# Patient Record
Sex: MALE | Race: WHITE | NOT HISPANIC OR LATINO | Employment: FULL TIME | URBAN - METROPOLITAN AREA
[De-identification: names, ages, dates, MRNs, and addresses within clinical notes are randomized per-mention and may not be internally consistent; named-entity substitution may affect disease eponyms.]

---

## 2018-06-01 ENCOUNTER — TRANSCRIBE ORDERS (OUTPATIENT)
Dept: LAB | Facility: HOSPITAL | Age: 43
End: 2018-06-01

## 2018-06-01 ENCOUNTER — LAB (OUTPATIENT)
Dept: LAB | Facility: HOSPITAL | Age: 43
End: 2018-06-01

## 2018-06-01 DIAGNOSIS — R19.7 DIARRHEA OF PRESUMED INFECTIOUS ORIGIN: Primary | ICD-10-CM

## 2018-06-01 DIAGNOSIS — R19.7 DIARRHEA OF PRESUMED INFECTIOUS ORIGIN: ICD-10-CM

## 2018-06-01 LAB — HEMOCCULT STL QL: NEGATIVE

## 2018-06-01 PROCEDURE — 82270 OCCULT BLOOD FECES: CPT

## 2018-06-01 PROCEDURE — 87177 OVA AND PARASITES SMEARS: CPT

## 2018-06-01 PROCEDURE — 87209 SMEAR COMPLEX STAIN: CPT

## 2018-06-04 LAB
O+P SPEC MICRO: NORMAL
O+P STL TRI STN: NORMAL

## 2019-04-05 PROBLEM — R33.9 URINARY RETENTION: Status: ACTIVE | Noted: 2019-04-05

## 2019-04-05 PROBLEM — R10.30 LOWER ABDOMINAL PAIN: Status: ACTIVE | Noted: 2019-04-05

## 2019-04-05 PROBLEM — E78.1 HYPERTRIGLYCERIDEMIA: Status: ACTIVE | Noted: 2019-04-05

## 2019-04-05 PROBLEM — I10 ESSENTIAL HYPERTENSION: Status: ACTIVE | Noted: 2019-04-05

## 2019-05-02 PROBLEM — J30.9 ALLERGIC RHINITIS: Status: ACTIVE | Noted: 2019-05-02

## 2019-05-02 PROBLEM — M54.50 PAIN IN LOWER BACK: Status: ACTIVE | Noted: 2019-05-02

## 2019-05-02 PROBLEM — R10.84 ABDOMINAL PAIN, GENERALIZED: Status: ACTIVE | Noted: 2019-05-02

## 2019-05-21 ENCOUNTER — LAB (OUTPATIENT)
Dept: LAB | Facility: HOSPITAL | Age: 44
End: 2019-05-21

## 2019-05-21 ENCOUNTER — OFFICE VISIT (OUTPATIENT)
Dept: INTERNAL MEDICINE | Facility: CLINIC | Age: 44
End: 2019-05-21

## 2019-05-21 VITALS
HEIGHT: 72 IN | BODY MASS INDEX: 25.87 KG/M2 | TEMPERATURE: 97.1 F | WEIGHT: 191 LBS | SYSTOLIC BLOOD PRESSURE: 118 MMHG | HEART RATE: 68 BPM | DIASTOLIC BLOOD PRESSURE: 90 MMHG

## 2019-05-21 DIAGNOSIS — I10 ESSENTIAL HYPERTENSION: ICD-10-CM

## 2019-05-21 DIAGNOSIS — Z00.00 PREVENTATIVE HEALTH CARE: Primary | ICD-10-CM

## 2019-05-21 DIAGNOSIS — Z12.5 SCREENING PSA (PROSTATE SPECIFIC ANTIGEN): ICD-10-CM

## 2019-05-21 DIAGNOSIS — Z13.29 SCREENING FOR ENDOCRINE DISORDER: ICD-10-CM

## 2019-05-21 DIAGNOSIS — E78.1 HYPERTRIGLYCERIDEMIA: ICD-10-CM

## 2019-05-21 LAB
ALBUMIN SERPL-MCNC: 4.8 G/DL (ref 3.5–5.2)
ALBUMIN UR-MCNC: 1.3 MG/L
ALBUMIN/GLOB SERPL: 2.7 G/DL
ALP SERPL-CCNC: 59 U/L (ref 39–117)
ALT SERPL W P-5'-P-CCNC: 16 U/L (ref 1–41)
ANION GAP SERPL CALCULATED.3IONS-SCNC: 11.9 MMOL/L
AST SERPL-CCNC: 19 U/L (ref 1–40)
BASOPHILS # BLD AUTO: 0.05 10*3/MM3 (ref 0–0.2)
BASOPHILS NFR BLD AUTO: 0.7 % (ref 0–1.5)
BILIRUB SERPL-MCNC: 0.8 MG/DL (ref 0.2–1.2)
BUN BLD-MCNC: 8 MG/DL (ref 6–20)
BUN/CREAT SERPL: 9.8 (ref 7–25)
CALCIUM SPEC-SCNC: 9.3 MG/DL (ref 8.6–10.5)
CHLORIDE SERPL-SCNC: 103 MMOL/L (ref 98–107)
CHOLEST SERPL-MCNC: 152 MG/DL (ref 0–200)
CO2 SERPL-SCNC: 27.1 MMOL/L (ref 22–29)
CREAT BLD-MCNC: 0.82 MG/DL (ref 0.76–1.27)
CREAT UR-MCNC: 108.1 MG/DL
DEPRECATED RDW RBC AUTO: 39.4 FL (ref 37–54)
EOSINOPHIL # BLD AUTO: 0 10*3/MM3 (ref 0–0.4)
EOSINOPHIL NFR BLD AUTO: 0 % (ref 0.3–6.2)
ERYTHROCYTE [DISTWIDTH] IN BLOOD BY AUTOMATED COUNT: 11.6 % (ref 12.3–15.4)
GFR SERPL CREATININE-BSD FRML MDRD: 103 ML/MIN/1.73
GLOBULIN UR ELPH-MCNC: 1.8 GM/DL
GLUCOSE BLD-MCNC: 96 MG/DL (ref 65–99)
HCT VFR BLD AUTO: 48.9 % (ref 37.5–51)
HDLC SERPL-MCNC: 48 MG/DL (ref 40–60)
HGB BLD-MCNC: 16.3 G/DL (ref 13–17.7)
IMM GRANULOCYTES # BLD AUTO: 0.02 10*3/MM3 (ref 0–0.05)
IMM GRANULOCYTES NFR BLD AUTO: 0.3 % (ref 0–0.5)
LDLC SERPL CALC-MCNC: 67 MG/DL (ref 0–100)
LDLC/HDLC SERPL: 1.39 {RATIO}
LYMPHOCYTES # BLD AUTO: 1.72 10*3/MM3 (ref 0.7–3.1)
LYMPHOCYTES NFR BLD AUTO: 24.4 % (ref 19.6–45.3)
MCH RBC QN AUTO: 31 PG (ref 26.6–33)
MCHC RBC AUTO-ENTMCNC: 33.3 G/DL (ref 31.5–35.7)
MCV RBC AUTO: 93 FL (ref 79–97)
MICROALBUMIN/CREAT UR: 12 MG/G
MONOCYTES # BLD AUTO: 0.53 10*3/MM3 (ref 0.1–0.9)
MONOCYTES NFR BLD AUTO: 7.5 % (ref 5–12)
NEUTROPHILS # BLD AUTO: 4.74 10*3/MM3 (ref 1.7–7)
NEUTROPHILS NFR BLD AUTO: 67.1 % (ref 42.7–76)
NRBC BLD AUTO-RTO: 0 /100 WBC (ref 0–0.2)
PLATELET # BLD AUTO: 190 10*3/MM3 (ref 140–450)
PMV BLD AUTO: 10.8 FL (ref 6–12)
POTASSIUM BLD-SCNC: 4.7 MMOL/L (ref 3.5–5.2)
PROT SERPL-MCNC: 6.6 G/DL (ref 6–8.5)
PSA SERPL-MCNC: 0.7 NG/ML (ref 0–4)
RBC # BLD AUTO: 5.26 10*6/MM3 (ref 4.14–5.8)
SODIUM BLD-SCNC: 142 MMOL/L (ref 136–145)
TRIGL SERPL-MCNC: 187 MG/DL (ref 0–150)
TSH SERPL DL<=0.05 MIU/L-ACNC: 2.12 MIU/ML (ref 0.27–4.2)
VLDLC SERPL-MCNC: 37.4 MG/DL (ref 5–40)
WBC NRBC COR # BLD: 7.06 10*3/MM3 (ref 3.4–10.8)

## 2019-05-21 PROCEDURE — 36415 COLL VENOUS BLD VENIPUNCTURE: CPT

## 2019-05-21 PROCEDURE — 85025 COMPLETE CBC W/AUTO DIFF WBC: CPT

## 2019-05-21 PROCEDURE — 82043 UR ALBUMIN QUANTITATIVE: CPT

## 2019-05-21 PROCEDURE — 80053 COMPREHEN METABOLIC PANEL: CPT

## 2019-05-21 PROCEDURE — 82570 ASSAY OF URINE CREATININE: CPT

## 2019-05-21 PROCEDURE — 84443 ASSAY THYROID STIM HORMONE: CPT

## 2019-05-21 PROCEDURE — G0103 PSA SCREENING: HCPCS

## 2019-05-21 PROCEDURE — 99396 PREV VISIT EST AGE 40-64: CPT | Performed by: INTERNAL MEDICINE

## 2019-05-21 PROCEDURE — 93000 ELECTROCARDIOGRAM COMPLETE: CPT | Performed by: INTERNAL MEDICINE

## 2019-05-21 PROCEDURE — 80061 LIPID PANEL: CPT

## 2019-05-21 RX ORDER — LISINOPRIL 20 MG/1
20 TABLET ORAL DAILY
Qty: 60 TABLET | Refills: 5 | Status: SHIPPED | OUTPATIENT
Start: 2019-05-21 | End: 2020-06-15

## 2019-05-21 RX ORDER — LISINOPRIL 10 MG/1
1 TABLET ORAL DAILY
COMMUNITY
Start: 2018-11-01 | End: 2019-05-21 | Stop reason: DRUGHIGH

## 2019-05-21 NOTE — PROGRESS NOTES
"Chief Complaint   Patient presents with   • Annual Exam     fasting for labs       History of Present Illness  44 y.o. white male presents for wellness exam. He has been doing ok. He denies chest pain or shortness of breath.    Review of Systems   Constitutional: Negative for chills and fever.   HENT: Positive for postnasal drip.    Respiratory: Negative for cough and shortness of breath.    Cardiovascular: Negative for chest pain and palpitations.   Gastrointestinal: Negative for abdominal pain, nausea and vomiting.   Genitourinary: Negative for dysuria.   Musculoskeletal: Negative for back pain.   Skin: Negative for rash.   Allergic/Immunologic: Positive for environmental allergies.   Neurological: Negative for dizziness, light-headedness and headaches.   Hematological: Negative for adenopathy.   Psychiatric/Behavioral: Negative for dysphoric mood. The patient is not nervous/anxious.    All other systems reviewed and are negative.    All other ROS reviewed and negative.    PMSFH:  The following portions of the patient's history were reviewed and updated as appropriate: allergies, current medications, past family history, past medical history, past social history, past surgical history and problem list.      Current Outpatient Medications:   •  lisinopril (PRINIVIL,ZESTRIL) 20 MG tablet, Take 1 tablet by mouth Daily. Take an additional dose if systolic >150 or diastolic >95, Disp: 60 tablet, Rfl: 5    VITALS:  /90   Pulse 68   Temp 97.1 °F (36.2 °C)   Ht 182 cm (71.65\")   Wt 86.6 kg (191 lb)   BMI 26.16 kg/m²     Physical Exam   Constitutional: He is oriented to person, place, and time. He appears well-developed and well-nourished.   HENT:   Head: Normocephalic.   Right Ear: External ear normal.   Left Ear: External ear normal.   Mouth/Throat: Oropharynx is clear and moist.   Eyes: Conjunctivae and EOM are normal.   Neck: No JVD present.   Cardiovascular: Normal rate, regular rhythm and normal heart " sounds.   Pulmonary/Chest: Effort normal and breath sounds normal. No respiratory distress.   Abdominal: Soft. Bowel sounds are normal. There is no tenderness.   Genitourinary: Penis normal.   Musculoskeletal: Normal range of motion.   Lymphadenopathy:     He has no cervical adenopathy.   Neurological: He is alert and oriented to person, place, and time.   Skin: Skin is warm and dry.   Psychiatric: He has a normal mood and affect. His behavior is normal.   Nursing note and vitals reviewed.      LABS:  Results for orders placed or performed in visit on 06/01/18   Ova & Parasite Examination - Stool, Per Rectum   Result Value Ref Range    Ova + Parasite Exam No ova or parasites seen on concentrated smear No Ova or Parasites Seen    Trichrome Stain No ova or parasites seen on trichrome stain    Occult Blood X 1, Stool - Stool, Per Rectum   Result Value Ref Range    Fecal Occult Blood Negative Negative         ECG 12 Lead  Date/Time: 5/21/2019 2:50 PM  Performed by: Keenan Jose MD  Authorized by: Keenan Jose MD   Comparison: compared with previous ECG from 5/10/2018  Comparison to previous ECG: More prominent bradycardia but basically stable  Rhythm: sinus bradycardia  QRS axis: normal                   ASSESSMENT/PLAN:  Problem List Items Addressed This Visit        Cardiovascular and Mediastinum    Hypertriglyceridemia     .Discussed improving diet and exercise. Specifically, decrease saturated fats and trans fats and avoid bad carbohydrates (sweet, breads , potatoes, and high caloric drinks).  Also aim for 150 minutes aerobic exercise weekly and resistance exercises 2-3x/week.           Relevant Orders    CBC & Differential    Essential hypertension     Hypertension is improving with treatment.  Continue current treatment regimen.  Dietary sodium restriction.  Weight loss.  Regular aerobic exercise.  Medication changes per orders.  Blood pressure will be reassessed at the next regular appointmentIncrease  the lisinopril. .         Relevant Medications    lisinopril (PRINIVIL,ZESTRIL) 20 MG tablet    Other Relevant Orders    Lipid Panel    Comprehensive Metabolic Panel    Microalbumin / Creatinine Urine Ratio - Urine, Clean Catch       Other    Preventative health care - Primary     Check labs and he will schedule colonoscopy on his  own. Encouraged to get an eye exam. Age-appropriate Counseling:  Discussed preventative medicine issues with patient including regular exercise, healthy diet, stress reduction, adequate sleep and recommended age-appropriate screening studies.  Immunizations reviewed.   He will schedule his w own Dermatology follow up and opthamology appointment. He declined Hep A vaccine.            Relevant Medications    lisinopril (PRINIVIL,ZESTRIL) 20 MG tablet      Other Visit Diagnoses     Screening for endocrine disorder        Relevant Orders    TSH Rfx On Abnormal To Free T4    Screening PSA (prostate specific antigen)        Relevant Orders    PSA Screen          FOLLOW-UP:  No Follow-up on file.      Electronically signed by:    Keenan Jose MD

## 2019-05-21 NOTE — ASSESSMENT & PLAN NOTE
Hypertension is improving with treatment.  Continue current treatment regimen.  Dietary sodium restriction.  Weight loss.  Regular aerobic exercise.  Medication changes per orders.  Blood pressure will be reassessed at the next regular appointmentIncrease the lisinopril. .

## 2019-05-21 NOTE — ASSESSMENT & PLAN NOTE
Check labs and he will schedule colonoscopy on his  own. Encouraged to get an eye exam. Age-appropriate Counseling:  Discussed preventative medicine issues with patient including regular exercise, healthy diet, stress reduction, adequate sleep and recommended age-appropriate screening studies.  Immunizations reviewed.   He will schedule his w own Dermatology follow up and opthamology appointment. He declined Hep A vaccine.

## 2019-10-22 ENCOUNTER — TELEPHONE (OUTPATIENT)
Dept: INTERNAL MEDICINE | Facility: CLINIC | Age: 44
End: 2019-10-22

## 2019-10-22 NOTE — TELEPHONE ENCOUNTER
Sylvain called to check on work Physician Health Screening Form that he faxed yesterday. He stated that he did still have two weeks to turn it in but he wanted to make sure we received it.    Sylvain would like a call at [phone] to verify.    Thank you.

## 2019-10-22 NOTE — TELEPHONE ENCOUNTER
Patient advised form has been received and is waiting on Dr miller signature before being faxed.

## 2020-05-22 ENCOUNTER — LAB (OUTPATIENT)
Dept: LAB | Facility: HOSPITAL | Age: 45
End: 2020-05-22

## 2020-05-22 ENCOUNTER — OFFICE VISIT (OUTPATIENT)
Dept: INTERNAL MEDICINE | Facility: CLINIC | Age: 45
End: 2020-05-22

## 2020-05-22 VITALS
TEMPERATURE: 97.2 F | BODY MASS INDEX: 26.36 KG/M2 | SYSTOLIC BLOOD PRESSURE: 116 MMHG | DIASTOLIC BLOOD PRESSURE: 70 MMHG | WEIGHT: 194.6 LBS | HEART RATE: 74 BPM | HEIGHT: 72 IN

## 2020-05-22 DIAGNOSIS — Z13.29 SCREENING FOR ENDOCRINE DISORDER: ICD-10-CM

## 2020-05-22 DIAGNOSIS — R10.84 ABDOMINAL PAIN, GENERALIZED: ICD-10-CM

## 2020-05-22 DIAGNOSIS — Z12.5 SCREENING PSA (PROSTATE SPECIFIC ANTIGEN): ICD-10-CM

## 2020-05-22 DIAGNOSIS — I10 ESSENTIAL HYPERTENSION: ICD-10-CM

## 2020-05-22 DIAGNOSIS — Z11.59 ENCOUNTER FOR HEPATITIS C SCREENING TEST FOR LOW RISK PATIENT: ICD-10-CM

## 2020-05-22 DIAGNOSIS — Z00.00 PREVENTATIVE HEALTH CARE: Primary | ICD-10-CM

## 2020-05-22 LAB
ALBUMIN SERPL-MCNC: 4.5 G/DL (ref 3.5–5.2)
ALBUMIN UR-MCNC: <1.2 MG/DL
ALBUMIN/GLOB SERPL: 2.6 G/DL
ALP SERPL-CCNC: 53 U/L (ref 39–117)
ALT SERPL W P-5'-P-CCNC: 15 U/L (ref 1–41)
ANION GAP SERPL CALCULATED.3IONS-SCNC: 11.2 MMOL/L (ref 5–15)
AST SERPL-CCNC: 22 U/L (ref 1–40)
BASOPHILS # BLD AUTO: 0.05 10*3/MM3 (ref 0–0.2)
BASOPHILS NFR BLD AUTO: 0.9 % (ref 0–1.5)
BILIRUB SERPL-MCNC: 0.8 MG/DL (ref 0.2–1.2)
BUN BLD-MCNC: 10 MG/DL (ref 6–20)
BUN/CREAT SERPL: 12 (ref 7–25)
CALCIUM SPEC-SCNC: 8.9 MG/DL (ref 8.6–10.5)
CHLORIDE SERPL-SCNC: 104 MMOL/L (ref 98–107)
CHOLEST SERPL-MCNC: 143 MG/DL (ref 0–200)
CO2 SERPL-SCNC: 24.8 MMOL/L (ref 22–29)
CREAT BLD-MCNC: 0.83 MG/DL (ref 0.76–1.27)
CREAT UR-MCNC: 123.9 MG/DL
DEPRECATED RDW RBC AUTO: 41.3 FL (ref 37–54)
EOSINOPHIL # BLD AUTO: 0.04 10*3/MM3 (ref 0–0.4)
EOSINOPHIL NFR BLD AUTO: 0.7 % (ref 0.3–6.2)
ERYTHROCYTE [DISTWIDTH] IN BLOOD BY AUTOMATED COUNT: 12.2 % (ref 12.3–15.4)
GFR SERPL CREATININE-BSD FRML MDRD: 101 ML/MIN/1.73
GLOBULIN UR ELPH-MCNC: 1.7 GM/DL
GLUCOSE BLD-MCNC: 92 MG/DL (ref 65–99)
HCT VFR BLD AUTO: 47.1 % (ref 37.5–51)
HCV AB SER DONR QL: NORMAL
HDLC SERPL-MCNC: 46 MG/DL (ref 40–60)
HGB BLD-MCNC: 16.3 G/DL (ref 13–17.7)
IMM GRANULOCYTES # BLD AUTO: 0.01 10*3/MM3 (ref 0–0.05)
IMM GRANULOCYTES NFR BLD AUTO: 0.2 % (ref 0–0.5)
LDLC SERPL CALC-MCNC: 69 MG/DL (ref 0–100)
LDLC/HDLC SERPL: 1.51 {RATIO}
LYMPHOCYTES # BLD AUTO: 1.83 10*3/MM3 (ref 0.7–3.1)
LYMPHOCYTES NFR BLD AUTO: 31.5 % (ref 19.6–45.3)
MCH RBC QN AUTO: 32 PG (ref 26.6–33)
MCHC RBC AUTO-ENTMCNC: 34.6 G/DL (ref 31.5–35.7)
MCV RBC AUTO: 92.5 FL (ref 79–97)
MICROALBUMIN/CREAT UR: NORMAL MG/G{CREAT}
MONOCYTES # BLD AUTO: 0.49 10*3/MM3 (ref 0.1–0.9)
MONOCYTES NFR BLD AUTO: 8.4 % (ref 5–12)
NEUTROPHILS # BLD AUTO: 3.39 10*3/MM3 (ref 1.7–7)
NEUTROPHILS NFR BLD AUTO: 58.3 % (ref 42.7–76)
NRBC BLD AUTO-RTO: 0 /100 WBC (ref 0–0.2)
PLATELET # BLD AUTO: 216 10*3/MM3 (ref 140–450)
PMV BLD AUTO: 11.2 FL (ref 6–12)
POTASSIUM BLD-SCNC: 5.1 MMOL/L (ref 3.5–5.2)
PROT SERPL-MCNC: 6.2 G/DL (ref 6–8.5)
PSA SERPL-MCNC: 0.57 NG/ML (ref 0–4)
RBC # BLD AUTO: 5.09 10*6/MM3 (ref 4.14–5.8)
SODIUM BLD-SCNC: 140 MMOL/L (ref 136–145)
TRIGL SERPL-MCNC: 138 MG/DL (ref 0–150)
TSH SERPL DL<=0.05 MIU/L-ACNC: 1.66 UIU/ML (ref 0.27–4.2)
VLDLC SERPL-MCNC: 27.6 MG/DL (ref 5–40)
WBC NRBC COR # BLD: 5.81 10*3/MM3 (ref 3.4–10.8)

## 2020-05-22 PROCEDURE — 82043 UR ALBUMIN QUANTITATIVE: CPT

## 2020-05-22 PROCEDURE — 82570 ASSAY OF URINE CREATININE: CPT

## 2020-05-22 PROCEDURE — 86803 HEPATITIS C AB TEST: CPT

## 2020-05-22 PROCEDURE — 80053 COMPREHEN METABOLIC PANEL: CPT

## 2020-05-22 PROCEDURE — 80061 LIPID PANEL: CPT

## 2020-05-22 PROCEDURE — 93000 ELECTROCARDIOGRAM COMPLETE: CPT | Performed by: INTERNAL MEDICINE

## 2020-05-22 PROCEDURE — 85025 COMPLETE CBC W/AUTO DIFF WBC: CPT

## 2020-05-22 PROCEDURE — 84443 ASSAY THYROID STIM HORMONE: CPT

## 2020-05-22 PROCEDURE — G0103 PSA SCREENING: HCPCS

## 2020-05-22 PROCEDURE — 99396 PREV VISIT EST AGE 40-64: CPT | Performed by: INTERNAL MEDICINE

## 2020-05-22 NOTE — ASSESSMENT & PLAN NOTE
Hypertension is improving with lifestyle modifications.  Continue current treatment regimen.  Dietary sodium restriction.  Weight loss.  Regular aerobic exercise.  Blood pressure will be reassessed in 3 months.

## 2020-06-12 DIAGNOSIS — Z00.00 PREVENTATIVE HEALTH CARE: ICD-10-CM

## 2020-06-15 RX ORDER — LISINOPRIL 20 MG/1
TABLET ORAL
Qty: 180 TABLET | Refills: 1 | Status: SHIPPED | OUTPATIENT
Start: 2020-06-15 | End: 2021-06-29

## 2020-11-19 ENCOUNTER — TELEPHONE (OUTPATIENT)
Dept: INTERNAL MEDICINE | Facility: CLINIC | Age: 45
End: 2020-11-19

## 2020-11-19 NOTE — TELEPHONE ENCOUNTER
Pt returned call,  I spoke with him earlier this morning and I faxed information then . Pt said they had received it.

## 2020-11-19 NOTE — TELEPHONE ENCOUNTER
Pt called request a copy be fax to his insurance company he stated that he has fax form several times to be filled out. He is re faxing form this will help with the cost of his insurance he needs this today 11/19/2020

## 2021-06-29 DIAGNOSIS — Z00.00 PREVENTATIVE HEALTH CARE: ICD-10-CM

## 2021-06-29 RX ORDER — LISINOPRIL 20 MG/1
TABLET ORAL
Qty: 180 TABLET | Refills: 1 | Status: SHIPPED | OUTPATIENT
Start: 2021-06-29 | End: 2022-10-31

## 2021-11-19 ENCOUNTER — OFFICE VISIT (OUTPATIENT)
Dept: ORTHOPEDIC SURGERY | Facility: CLINIC | Age: 46
End: 2021-11-19

## 2021-11-19 VITALS
DIASTOLIC BLOOD PRESSURE: 95 MMHG | WEIGHT: 200.4 LBS | SYSTOLIC BLOOD PRESSURE: 129 MMHG | HEART RATE: 65 BPM | BODY MASS INDEX: 27.14 KG/M2 | HEIGHT: 72 IN

## 2021-11-19 DIAGNOSIS — M72.2 PLANTAR FASCIITIS OF LEFT FOOT: Primary | ICD-10-CM

## 2021-11-19 PROCEDURE — 99203 OFFICE O/P NEW LOW 30 MIN: CPT | Performed by: PHYSICIAN ASSISTANT

## 2021-11-19 NOTE — PROGRESS NOTES
[facility] Note        Subjective     Pain of the Left Heel      HPI    Sylvain Lomeli is a 46 y.o. male.  This is a very pleasant patient presented today to discuss his left heel pain since 2021.  No trauma no injury.  He reports it began when he started running again.  He has pain first thing in the morning getting up from a seated position.  He has treated with rest ice and elevation and intermittent stretching.  Here for further evaluation treatment conditions.    Past Medical History:   Diagnosis Date   • High blood pressure    • Internal hemorrhoids     anal fissure   • Knee pain       Past Surgical History:   Procedure Laterality Date   • COLONOSCOPY      DR Ndiaye   • KNEE SURGERY        Family History   Problem Relation Age of Onset   • Hypertension Father    • Breast cancer Other      Social History     Socioeconomic History   • Marital status:    Tobacco Use   • Smoking status: Former Smoker     Packs/day: 20.00     Years: 5.00     Pack years: 100.00     Types: Cigarettes     Quit date:      Years since quittin.8   • Smokeless tobacco: Never Used   Substance and Sexual Activity   • Alcohol use: Not Currently     Alcohol/week: 3.0 standard drinks     Types: 3 Cans of beer per week   • Drug use: Never      Current Outpatient Medications on File Prior to Visit   Medication Sig Dispense Refill   • lisinopril (PRINIVIL,ZESTRIL) 20 MG tablet TAKE 1 TABLET BY MOUTH DAILY. TAKE AN ADDITIONAL DOSE IF SYSTOLIC GREATER THAN 150 OR DYSTOLIC LESS THAN 95. 180 tablet 1     No current facility-administered medications on file prior to visit.      Allergies   Allergen Reactions   • Penicillins Hives and Rash          Review of Systems   Constitutional: Negative.    HENT: Negative.    Eyes: Negative.    Respiratory: Negative.    Cardiovascular: Negative.    Gastrointestinal: Negative.    Endocrine: Negative.    Genitourinary: Negative.    Musculoskeletal:  "Positive for arthralgias.   Skin: Negative.    Allergic/Immunologic: Negative.    Neurological: Negative.    Hematological: Negative.    Psychiatric/Behavioral: Negative.         I reviewed the patient's chief complaint, history of present illness, review of systems, past medical history, surgical history, family history, social history, medications and allergy list.        Objective      Physical Exam  /95   Pulse 65   Ht 182 cm (71.65\")   Wt 90.9 kg (200 lb 6.4 oz)   BMI 27.44 kg/m²     Body mass index is 27.44 kg/m².    General  Mental Status - alert  General Appearance - cooperative, well groomed, not in acute distress  Orientation - Oriented X3  Build & Nutrition - well developed and well nourished  Posture - normal posture  Gait -mildly antalgic first few steps       Ortho Exam  V:  Dorsalis Pedis:   Left:2+    Posterior Tibial:Left:2+    Capillary Refill:  Brisk  MSK:  Tibia:   Left:  non tender      Ankle:   Left:  non tender, ROM  normal and motor function  normal      Foot:   Left:  tender Over the origin the plantar fascia      NEURO: Bellwood-Roberto 5.07 monofilament test: not evaluated    Lower extremity sensation: intact     Calf Atrophy:none    Motor Function: all 5/5            Imaging/Studies  Imaging Results (Last 24 Hours)     ** No results found for the last 24 hours. **            Assessment    Assessment:  1. Plantar fasciitis of left foot          Plan:  1. Recommend over-the-counter medication as needed for discomfort  2. Left plantar fasciitis:  I explained plantar fasciitis in detail to the patient.  I explained to the bow-string mechanism of the plantar fascia.  I went over the current research of the [employer] with them.  I explained the treatments that were not statistically significant in improving the problem including: injection of steroids, special orthotics, special shoes, surgery, medication, ice, not working, etc.    I explained the " "treatments that are statistically significant at improving the problem.  These include stretching/night splinting, casting, PRP.    I explained the most important treatment: Stretching and night splinting.  I went over the patient information sheet with them, I showed them the stretches and they were able to reproduce them.  I emphasized the \"toe pull\" as the most important stretch.  They need to do the stretches 5 repetitions each, 6-8 times per day.  I explained how to do them at work, at home, before getting out of bed, before getting out of the car, and before getting up from a chair.    We provided them with a night splint.    If they do not improve after 4 months of aggressive stretching and night splinting, the next step will be a short leg fiberglass walking cast worn for 6 weeks.    · Preprinted education was provided to the patient.    Patient will begin stretching a night splint and referred return for casting if needed        .          Carolina Llamas PA-C  11/19/21  08:58 EST  "

## 2022-01-21 ENCOUNTER — OFFICE VISIT (OUTPATIENT)
Dept: INTERNAL MEDICINE | Facility: CLINIC | Age: 47
End: 2022-01-21

## 2022-01-21 ENCOUNTER — LAB (OUTPATIENT)
Dept: LAB | Facility: HOSPITAL | Age: 47
End: 2022-01-21

## 2022-01-21 VITALS
HEIGHT: 72 IN | DIASTOLIC BLOOD PRESSURE: 82 MMHG | HEART RATE: 66 BPM | TEMPERATURE: 98.4 F | WEIGHT: 203 LBS | BODY MASS INDEX: 27.5 KG/M2 | SYSTOLIC BLOOD PRESSURE: 114 MMHG

## 2022-01-21 DIAGNOSIS — E78.1 HYPERTRIGLYCERIDEMIA: ICD-10-CM

## 2022-01-21 DIAGNOSIS — Z00.00 PREVENTATIVE HEALTH CARE: Primary | ICD-10-CM

## 2022-01-21 DIAGNOSIS — D22.9 MULTIPLE NEVI: ICD-10-CM

## 2022-01-21 DIAGNOSIS — Z12.5 SCREENING PSA (PROSTATE SPECIFIC ANTIGEN): ICD-10-CM

## 2022-01-21 DIAGNOSIS — Z12.11 SCREEN FOR COLON CANCER: ICD-10-CM

## 2022-01-21 DIAGNOSIS — I10 ESSENTIAL HYPERTENSION: ICD-10-CM

## 2022-01-21 LAB
ALBUMIN SERPL-MCNC: 4.5 G/DL (ref 3.5–5.2)
ALBUMIN/GLOB SERPL: 2.5 G/DL
ALP SERPL-CCNC: 62 U/L (ref 39–117)
ALT SERPL W P-5'-P-CCNC: 33 U/L (ref 1–41)
ANION GAP SERPL CALCULATED.3IONS-SCNC: 8.8 MMOL/L (ref 5–15)
AST SERPL-CCNC: 31 U/L (ref 1–40)
BILIRUB SERPL-MCNC: 0.6 MG/DL (ref 0–1.2)
BUN SERPL-MCNC: 10 MG/DL (ref 6–20)
BUN/CREAT SERPL: 11.9 (ref 7–25)
CALCIUM SPEC-SCNC: 9.2 MG/DL (ref 8.6–10.5)
CHLORIDE SERPL-SCNC: 100 MMOL/L (ref 98–107)
CHOLEST SERPL-MCNC: 164 MG/DL (ref 0–200)
CO2 SERPL-SCNC: 29.2 MMOL/L (ref 22–29)
CREAT SERPL-MCNC: 0.84 MG/DL (ref 0.76–1.27)
GFR SERPL CREATININE-BSD FRML MDRD: 99 ML/MIN/1.73
GLOBULIN UR ELPH-MCNC: 1.8 GM/DL
GLUCOSE SERPL-MCNC: 105 MG/DL (ref 65–99)
HDLC SERPL-MCNC: 41 MG/DL (ref 40–60)
LDLC SERPL CALC-MCNC: 89 MG/DL (ref 0–100)
LDLC/HDLC SERPL: 2.01 {RATIO}
POTASSIUM SERPL-SCNC: 4.5 MMOL/L (ref 3.5–5.2)
PROT SERPL-MCNC: 6.3 G/DL (ref 6–8.5)
PSA SERPL-MCNC: 0.62 NG/ML (ref 0–4)
SODIUM SERPL-SCNC: 138 MMOL/L (ref 136–145)
TRIGL SERPL-MCNC: 203 MG/DL (ref 0–150)
TSH SERPL DL<=0.05 MIU/L-ACNC: 1.31 UIU/ML (ref 0.27–4.2)
VLDLC SERPL-MCNC: 34 MG/DL (ref 5–40)

## 2022-01-21 PROCEDURE — 93000 ELECTROCARDIOGRAM COMPLETE: CPT | Performed by: INTERNAL MEDICINE

## 2022-01-21 PROCEDURE — 82043 UR ALBUMIN QUANTITATIVE: CPT

## 2022-01-21 PROCEDURE — G0103 PSA SCREENING: HCPCS

## 2022-01-21 PROCEDURE — 99396 PREV VISIT EST AGE 40-64: CPT | Performed by: INTERNAL MEDICINE

## 2022-01-21 PROCEDURE — 82570 ASSAY OF URINE CREATININE: CPT

## 2022-01-21 PROCEDURE — 80061 LIPID PANEL: CPT

## 2022-01-21 PROCEDURE — 80053 COMPREHEN METABOLIC PANEL: CPT

## 2022-01-21 PROCEDURE — 84443 ASSAY THYROID STIM HORMONE: CPT

## 2022-01-21 NOTE — PROGRESS NOTES
"Chief Complaint   Patient presents with   • Annual Exam   • Plantar Fasciitis     left foot.  Seeing ortho       History of Present Illness  46 y.o. white male presents for wellness exam.     Review of Systems   Constitutional: Negative for chills and fever.   Respiratory: Negative for cough and shortness of breath.    Cardiovascular: Negative for chest pain and palpitations.   Gastrointestinal: Negative for abdominal pain, nausea and vomiting.   Skin: Negative for rash.   Neurological: Negative for dizziness, light-headedness and headaches.   Psychiatric/Behavioral: Negative for dysphoric mood.   All other systems reviewed and are negative.    .    PMSFH:  The following portions of the patient's history were reviewed and updated as appropriate: allergies, current medications, past family history, past medical history, past social history, past surgical history and problem list.      Current Outpatient Medications:   •  lisinopril (PRINIVIL,ZESTRIL) 20 MG tablet, TAKE 1 TABLET BY MOUTH DAILY. TAKE AN ADDITIONAL DOSE IF SYSTOLIC GREATER THAN 150 OR DYSTOLIC LESS THAN 95., Disp: 180 tablet, Rfl: 1    VITALS:  /82 (BP Location: Left arm, Patient Position: Sitting, Cuff Size: Adult)   Pulse 66   Temp 98.4 °F (36.9 °C) (Infrared)   Ht 182 cm (71.65\")   Wt 92.1 kg (203 lb)   BMI 27.80 kg/m²     Physical Exam  Vitals and nursing note reviewed.   Constitutional:       Appearance: Normal appearance. He is well-developed.   HENT:      Head: Normocephalic.      Right Ear: Tympanic membrane and ear canal normal.      Left Ear: Tympanic membrane and ear canal normal.   Eyes:      Extraocular Movements: Extraocular movements intact.      Conjunctiva/sclera: Conjunctivae normal.   Cardiovascular:      Rate and Rhythm: Normal rate and regular rhythm.      Heart sounds: Normal heart sounds.   Pulmonary:      Effort: Pulmonary effort is normal. No respiratory distress.      Breath sounds: Normal breath sounds.   Abdominal: "      General: Bowel sounds are normal.      Palpations: Abdomen is soft.      Tenderness: There is no abdominal tenderness.   Genitourinary:     Testes: Normal.   Skin:     General: Skin is warm and dry.   Neurological:      General: No focal deficit present.      Mental Status: He is alert and oriented to person, place, and time.   Psychiatric:         Mood and Affect: Mood normal.         Behavior: Behavior normal.         LABS:        ECG 12 Lead    Date/Time: 1/21/2022 10:19 AM  Performed by: Keenan Jose MD  Authorized by: Keenan Jose MD   Comparison: compared with previous ECG from 5/22/2020  Rhythm: sinus rhythm and sinus bradycardia  Rate: bradycardic  Conduction: conduction normal  ST Segments: ST segments normal  T Waves: T waves normal  QRS axis: normal    Clinical impression: normal ECG                 ASSESSMENT/PLAN:  Problems Addressed this Visit        Cardiac and Vasculature    Essential hypertension     Hypertension is improving with treatment.  Continue current treatment regimen.  Dietary sodium restriction.  Weight loss.  Regular aerobic exercise.  Blood pressure will be reassessed at the next regular appointment.         Relevant Orders    Comprehensive Metabolic Panel    Lipid Panel    Microalbumin / Creatinine Urine Ratio - Urine, Clean Catch    Hypertriglyceridemia     Lipid abnormalities are improving with lifestyle modifications.  Nutritional counseling was provided.  Lipids will be reassessed in 1 year.         Relevant Orders    TSH Rfx On Abnormal To Free T4       Health Encounters    Preventative health care - Primary     His mood is good overall. He does regular exercise. Proceed with colonoscopy. Encouraged to get an eye exam. Age-appropriate Counseling:  Discussed preventative medicine issues with patient including regular exercise, healthy diet, stress reduction, adequate sleep and recommended age-appropriate screening studies.  Immunizations reviewed.                 Other Visit Diagnoses     Screen for colon cancer        Relevant Orders    Ambulatory Referral For Screening Colonoscopy (Completed)    Screening PSA (prostate specific antigen)        Relevant Orders    PSA Screen    Multiple nevi        Relevant Orders    Ambulatory Referral to Dermatology (Completed)      Diagnoses       Codes Comments    Preventative health care    -  Primary ICD-10-CM: Z00.00  ICD-9-CM: V70.0     Essential hypertension     ICD-10-CM: I10  ICD-9-CM: 401.9     Hypertriglyceridemia     ICD-10-CM: E78.1  ICD-9-CM: 272.1     Screen for colon cancer     ICD-10-CM: Z12.11  ICD-9-CM: V76.51     Screening PSA (prostate specific antigen)     ICD-10-CM: Z12.5  ICD-9-CM: V76.44     Multiple nevi     ICD-10-CM: D22.9  ICD-9-CM: 216.9           FOLLOW-UP:  Return for Annual physical, Labs this visit.      Electronically signed by:    Keenan Jose MD

## 2022-01-21 NOTE — ASSESSMENT & PLAN NOTE
His mood is good overall. He does regular exercise. Proceed with colonoscopy. Encouraged to get an eye exam. Age-appropriate Counseling:  Discussed preventative medicine issues with patient including regular exercise, healthy diet, stress reduction, adequate sleep and recommended age-appropriate screening studies.  Immunizations reviewed.

## 2022-01-22 LAB
ALBUMIN UR-MCNC: <1.2 MG/DL
CREAT UR-MCNC: 117.8 MG/DL
MICROALBUMIN/CREAT UR: NORMAL MG/G{CREAT}

## 2022-03-04 ENCOUNTER — AMBULATORY SURGICAL CENTER (OUTPATIENT)
Dept: URBAN - METROPOLITAN AREA SURGERY 10 | Facility: SURGERY | Age: 47
End: 2022-03-04
Payer: COMMERCIAL

## 2022-03-04 DIAGNOSIS — Z12.11 ENCOUNTER FOR SCREENING FOR MALIGNANT NEOPLASM OF COLON: ICD-10-CM

## 2022-03-04 DIAGNOSIS — K57.30 DIVERTICULOSIS OF LARGE INTESTINE WITHOUT PERFORATION OR ABS: ICD-10-CM

## 2022-03-04 DIAGNOSIS — K64.1 SECOND DEGREE HEMORRHOIDS: ICD-10-CM

## 2022-03-04 PROCEDURE — 45378 DIAGNOSTIC COLONOSCOPY: CPT | Mod: 33 | Performed by: INTERNAL MEDICINE

## 2022-10-13 ENCOUNTER — TELEPHONE (OUTPATIENT)
Dept: INTERNAL MEDICINE | Facility: CLINIC | Age: 47
End: 2022-10-13

## 2022-10-13 RX ORDER — TRIAMCINOLONE ACETONIDE 1 MG/G
1 CREAM TOPICAL 2 TIMES DAILY
Qty: 45 G | Refills: 0 | Status: SHIPPED | OUTPATIENT
Start: 2022-10-13 | End: 2022-10-20

## 2022-10-13 NOTE — TELEPHONE ENCOUNTER
PT STATES HE HAS POISON IVY, BEEN TREATING IT FOR A FEW DAYS AND IT IS GETTING WORSE.  PT WANTS TO KNOW IF THERE IS ANYTHING THAT CAN BE DONE.     PLEASE CALL THE PT BACK [phone]

## 2022-10-30 DIAGNOSIS — Z00.00 PREVENTATIVE HEALTH CARE: ICD-10-CM

## 2022-10-31 RX ORDER — LISINOPRIL 20 MG/1
TABLET ORAL
Qty: 180 TABLET | Refills: 1 | Status: SHIPPED | OUTPATIENT
Start: 2022-10-31 | End: 2023-01-27 | Stop reason: SDUPTHER

## 2022-11-14 ENCOUNTER — TELEPHONE (OUTPATIENT)
Dept: INTERNAL MEDICINE | Facility: CLINIC | Age: 47
End: 2022-11-14

## 2022-11-14 NOTE — TELEPHONE ENCOUNTER
Caller: Sylvain Lomeli    Relationship: Self    Best call back number:[phone]    What is the best time to reach you: ANY TIME    Who are you requesting to speak with (clinical staff, provider,  specific staff member): NURSE    Do you know the name of the person who called: SELF    What was the call regarding: PATIENT FAXED A PHYSICAL FORM TODAY TO OFFICE AND WAS CHECKING TO SEE IF OFFICE RECEIVED IT. PLEASE ADVISE    Do you require a callback: YES

## 2022-11-15 ENCOUNTER — TELEPHONE (OUTPATIENT)
Dept: INTERNAL MEDICINE | Facility: CLINIC | Age: 47
End: 2022-11-15

## 2022-11-15 NOTE — TELEPHONE ENCOUNTER
Pt called to verify that we received the health screening form that he faxed in to the office yesterday. He requested a call back.

## 2023-01-27 ENCOUNTER — LAB (OUTPATIENT)
Dept: LAB | Facility: HOSPITAL | Age: 48
End: 2023-01-27
Payer: COMMERCIAL

## 2023-01-27 ENCOUNTER — OFFICE VISIT (OUTPATIENT)
Dept: INTERNAL MEDICINE | Facility: CLINIC | Age: 48
End: 2023-01-27
Payer: COMMERCIAL

## 2023-01-27 VITALS
WEIGHT: 200 LBS | BODY MASS INDEX: 27.09 KG/M2 | HEIGHT: 72 IN | HEART RATE: 60 BPM | SYSTOLIC BLOOD PRESSURE: 110 MMHG | DIASTOLIC BLOOD PRESSURE: 72 MMHG | TEMPERATURE: 98 F

## 2023-01-27 DIAGNOSIS — E78.1 HYPERTRIGLYCERIDEMIA: ICD-10-CM

## 2023-01-27 DIAGNOSIS — Z12.5 SCREENING PSA (PROSTATE SPECIFIC ANTIGEN): ICD-10-CM

## 2023-01-27 DIAGNOSIS — I10 ESSENTIAL HYPERTENSION: ICD-10-CM

## 2023-01-27 DIAGNOSIS — Z00.00 PREVENTATIVE HEALTH CARE: Primary | ICD-10-CM

## 2023-01-27 DIAGNOSIS — K57.90 DIVERTICULOSIS: ICD-10-CM

## 2023-01-27 LAB
ALBUMIN SERPL-MCNC: 4.2 G/DL (ref 3.5–5.2)
ALBUMIN UR-MCNC: <1.2 MG/DL
ALBUMIN/GLOB SERPL: 2 G/DL
ALP SERPL-CCNC: 61 U/L (ref 39–117)
ALT SERPL W P-5'-P-CCNC: 20 U/L (ref 1–41)
ANION GAP SERPL CALCULATED.3IONS-SCNC: 11 MMOL/L (ref 5–15)
AST SERPL-CCNC: 23 U/L (ref 1–40)
BASOPHILS # BLD AUTO: 0.08 10*3/MM3 (ref 0–0.2)
BASOPHILS NFR BLD AUTO: 1.3 % (ref 0–1.5)
BILIRUB SERPL-MCNC: 0.6 MG/DL (ref 0–1.2)
BUN SERPL-MCNC: 8 MG/DL (ref 6–20)
BUN/CREAT SERPL: 9 (ref 7–25)
CALCIUM SPEC-SCNC: 9.2 MG/DL (ref 8.6–10.5)
CHLORIDE SERPL-SCNC: 105 MMOL/L (ref 98–107)
CHOLEST SERPL-MCNC: 137 MG/DL (ref 0–200)
CO2 SERPL-SCNC: 26 MMOL/L (ref 22–29)
CREAT SERPL-MCNC: 0.89 MG/DL (ref 0.76–1.27)
CREAT UR-MCNC: 119 MG/DL
DEPRECATED RDW RBC AUTO: 41.2 FL (ref 37–54)
EGFRCR SERPLBLD CKD-EPI 2021: 107 ML/MIN/1.73
EOSINOPHIL # BLD AUTO: 0.01 10*3/MM3 (ref 0–0.4)
EOSINOPHIL NFR BLD AUTO: 0.2 % (ref 0.3–6.2)
ERYTHROCYTE [DISTWIDTH] IN BLOOD BY AUTOMATED COUNT: 12.2 % (ref 12.3–15.4)
GLOBULIN UR ELPH-MCNC: 2.1 GM/DL
GLUCOSE SERPL-MCNC: 91 MG/DL (ref 65–99)
HCT VFR BLD AUTO: 46.5 % (ref 37.5–51)
HCYS SERPL-MCNC: 8.6 UMOL/L (ref 0–15)
HDLC SERPL-MCNC: 41 MG/DL (ref 40–60)
HGB BLD-MCNC: 15.7 G/DL (ref 13–17.7)
IMM GRANULOCYTES # BLD AUTO: 0.02 10*3/MM3 (ref 0–0.05)
IMM GRANULOCYTES NFR BLD AUTO: 0.3 % (ref 0–0.5)
LDLC SERPL CALC-MCNC: 68 MG/DL (ref 0–100)
LDLC/HDLC SERPL: 1.54 {RATIO}
LYMPHOCYTES # BLD AUTO: 1.85 10*3/MM3 (ref 0.7–3.1)
LYMPHOCYTES NFR BLD AUTO: 29.8 % (ref 19.6–45.3)
MCH RBC QN AUTO: 31.4 PG (ref 26.6–33)
MCHC RBC AUTO-ENTMCNC: 33.8 G/DL (ref 31.5–35.7)
MCV RBC AUTO: 93 FL (ref 79–97)
MICROALBUMIN/CREAT UR: NORMAL MG/G{CREAT}
MONOCYTES # BLD AUTO: 0.55 10*3/MM3 (ref 0.1–0.9)
MONOCYTES NFR BLD AUTO: 8.9 % (ref 5–12)
NEUTROPHILS NFR BLD AUTO: 3.7 10*3/MM3 (ref 1.7–7)
NEUTROPHILS NFR BLD AUTO: 59.5 % (ref 42.7–76)
NRBC BLD AUTO-RTO: 0 /100 WBC (ref 0–0.2)
PLATELET # BLD AUTO: 224 10*3/MM3 (ref 140–450)
PMV BLD AUTO: 10.7 FL (ref 6–12)
POTASSIUM SERPL-SCNC: 4.5 MMOL/L (ref 3.5–5.2)
PROT SERPL-MCNC: 6.3 G/DL (ref 6–8.5)
PSA SERPL-MCNC: 0.62 NG/ML (ref 0–4)
RBC # BLD AUTO: 5 10*6/MM3 (ref 4.14–5.8)
SODIUM SERPL-SCNC: 142 MMOL/L (ref 136–145)
TRIGL SERPL-MCNC: 165 MG/DL (ref 0–150)
TSH SERPL DL<=0.05 MIU/L-ACNC: 1.11 UIU/ML (ref 0.27–4.2)
VLDLC SERPL-MCNC: 28 MG/DL (ref 5–40)
WBC NRBC COR # BLD: 6.21 10*3/MM3 (ref 3.4–10.8)

## 2023-01-27 PROCEDURE — 83090 ASSAY OF HOMOCYSTEINE: CPT

## 2023-01-27 PROCEDURE — 80050 GENERAL HEALTH PANEL: CPT

## 2023-01-27 PROCEDURE — G0103 PSA SCREENING: HCPCS

## 2023-01-27 PROCEDURE — 82043 UR ALBUMIN QUANTITATIVE: CPT

## 2023-01-27 PROCEDURE — 93000 ELECTROCARDIOGRAM COMPLETE: CPT | Performed by: INTERNAL MEDICINE

## 2023-01-27 PROCEDURE — 99396 PREV VISIT EST AGE 40-64: CPT | Performed by: INTERNAL MEDICINE

## 2023-01-27 PROCEDURE — 80061 LIPID PANEL: CPT

## 2023-01-27 PROCEDURE — 82570 ASSAY OF URINE CREATININE: CPT

## 2023-01-27 RX ORDER — LISINOPRIL 20 MG/1
20 TABLET ORAL NIGHTLY
Qty: 90 TABLET | Refills: 3 | Status: SHIPPED | OUTPATIENT
Start: 2023-01-27

## 2023-01-27 NOTE — PROGRESS NOTES
"Chief Complaint   Patient presents with   • Annual Exam     fasting       History of Present Illness  47 y.o. male presents for wellness exam.     Review of Systems   Constitutional: Negative for chills and fever.   Respiratory: Negative for cough and shortness of breath.    Cardiovascular: Negative for chest pain and palpitations.   Gastrointestinal: Negative for abdominal pain, nausea and vomiting.   Skin: Negative for rash.   Neurological: Negative for dizziness, light-headedness and headaches.   Psychiatric/Behavioral: Negative for decreased concentration and dysphoric mood.   All other systems reviewed and are negative.    .    PMSFH:  The following portions of the patient's history were reviewed and updated as appropriate: allergies, current medications, past family history, past medical history, past social history, past surgical history and problem list.      Current Outpatient Medications:   •  lisinopril (PRINIVIL,ZESTRIL) 20 MG tablet, Take 1 tablet by mouth Every Night., Disp: 90 tablet, Rfl: 3    VITALS:  /72   Pulse 60   Temp 98 °F (36.7 °C)   Ht 182 cm (71.65\")   Wt 90.7 kg (200 lb)   BMI 27.39 kg/m²     Physical Exam  Vitals and nursing note reviewed.   Constitutional:       Appearance: Normal appearance. He is well-developed.   HENT:      Head: Normocephalic.      Right Ear: Tympanic membrane and ear canal normal.      Left Ear: Tympanic membrane and ear canal normal.   Eyes:      Extraocular Movements: Extraocular movements intact.      Conjunctiva/sclera: Conjunctivae normal.   Cardiovascular:      Rate and Rhythm: Normal rate and regular rhythm.   Pulmonary:      Effort: Pulmonary effort is normal. No respiratory distress.      Breath sounds: Normal breath sounds.   Abdominal:      General: Bowel sounds are normal.      Palpations: Abdomen is soft.      Tenderness: There is no abdominal tenderness.   Genitourinary:     Testes: Normal.   Skin:     General: Skin is warm and dry. "   Neurological:      General: No focal deficit present.      Mental Status: He is alert and oriented to person, place, and time.   Psychiatric:         Mood and Affect: Mood normal.         Behavior: Behavior normal.         LABS:    CMP:  Lab Results   Component Value Date    BUN 10 01/21/2022    CREATININE 0.84 01/21/2022    EGFRIFNONA 99 01/21/2022     01/21/2022    K 4.5 01/21/2022     01/21/2022    CALCIUM 9.2 01/21/2022    ALBUMIN 4.50 01/21/2022    BILITOT 0.6 01/21/2022    ALKPHOS 62 01/21/2022    AST 31 01/21/2022    ALT 33 01/21/2022     CBC:  Lab Results   Component Value Date    WBC 5.81 05/22/2020    RBC 5.09 05/22/2020    HGB 16.3 05/22/2020    HCT 47.1 05/22/2020    MCV 92.5 05/22/2020    MCH 32.0 05/22/2020    MCHC 34.6 05/22/2020    RDW 12.2 (L) 05/22/2020     05/22/2020     LIPID PANEL:  Lab Results   Component Value Date    TRIG 203 (H) 01/21/2022    HDL 41 01/21/2022    VLDL 34 01/21/2022    LDL 89 01/21/2022    LDLHDL 2.01 01/21/2022       ECG 12 Lead    Date/Time: 1/27/2023 10:38 AM  Performed by: Keenan Jose MD  Authorized by: Keenan Jose MD   Comparison: compared with previous ECG from 1/21/2022  Similar to previous ECG  Rhythm: sinus bradycardia  Rate: bradycardic  QRS axis: normal                   ASSESSMENT/PLAN:  Problems Addressed this Visit        Cardiac and Vasculature    Essential hypertension     Hypertension is unchanged.  Continue current treatment regimen.  Regular aerobic exercise.  Blood pressure will be reassessed at the next regular appointment.         Relevant Medications    lisinopril (PRINIVIL,ZESTRIL) 20 MG tablet    Other Relevant Orders    Microalbumin / Creatinine Urine Ratio - Urine, Clean Catch    Hypertriglyceridemia     Lipid abnormalities are unchanged.  Nutritional counseling was provided.  Lipids will be reassessed in 1 year.         Relevant Orders    Homocysteine    CBC & Differential    Comprehensive Metabolic Panel    Lipid  Panel    TSH Rfx On Abnormal To Free T4       Gastrointestinal Abdominal     Diverticulosis     Do konsyl prior to supper            Health Encounters    Preventative health care - Primary     Mood good overall. He had an eye exam with [facility] this month ok. Age-appropriate Counseling:  Discussed preventative medicine issues with patient including regular exercise, healthy diet, stress reduction, adequate sleep and recommended age-appropriate screening studies.  Immunizations reviewed.              Relevant Medications    lisinopril (PRINIVIL,ZESTRIL) 20 MG tablet   Other Visit Diagnoses     Screening PSA (prostate specific antigen)        Relevant Orders    PSA Screen      Diagnoses       Codes Comments    Preventative health care    -  Primary ICD-10-CM: Z00.00  ICD-9-CM: V70.0     Essential hypertension     ICD-10-CM: I10  ICD-9-CM: 401.9     Hypertriglyceridemia     ICD-10-CM: E78.1  ICD-9-CM: 272.1     Screening PSA (prostate specific antigen)     ICD-10-CM: Z12.5  ICD-9-CM: V76.44     Diverticulosis     ICD-10-CM: K57.90  ICD-9-CM: 562.10           FOLLOW-UP:  Return for Annual physical, Labs this visit.      Electronically signed by:    Keenan Jose MD

## 2023-01-27 NOTE — ASSESSMENT & PLAN NOTE
Mood good overall. He had an eye exam with [facility] this month ok. Age-appropriate Counseling:  Discussed preventative medicine issues with patient including regular exercise, healthy diet, stress reduction, adequate sleep and recommended age-appropriate screening studies.  Immunizations reviewed.

## 2023-04-17 ENCOUNTER — TELEPHONE (OUTPATIENT)
Dept: INTERNAL MEDICINE | Facility: CLINIC | Age: 48
End: 2023-04-17

## 2023-04-17 NOTE — TELEPHONE ENCOUNTER
Caller: Sylvain Lomeli    Relationship: Self    Best call back number: [phone]    What medications are you currently taking:   Current Outpatient Medications on File Prior to Visit   Medication Sig Dispense Refill   • lisinopril (PRINIVIL,ZESTRIL) 20 MG tablet Take 1 tablet by mouth Every Night. 90 tablet 3     No current facility-administered medications on file prior to visit.        What are your concerns: LAST YEAR PATIENT HAD POISON BENNETT. PATIENT WAS PRESCRIBED TRIAMCINOLONE ACETONIDE 0.1% TOPICAL CREAM. PATIENT HAS POISON IVY AGAIN IN A DIFFERENT SPOT-ON THE TOP OF HEAD. PATIENT IS WANTING TO MAKE SURE IT IS SAFE TO USE ON THE TOP OF HIS HEAD.

## 2023-04-18 NOTE — PROGRESS NOTES
"Chief Complaint   Patient presents with   • Annual Exam       History of Present Illness  45 y.o. white male presents for wellness exam. He denies chest pain or shortness of breath.     Review of Systems   Constitutional: Negative for chills, fatigue and fever.   HENT: Negative for congestion, ear pain and sinus pressure.    Respiratory: Negative for cough, chest tightness, shortness of breath and wheezing.    Cardiovascular: Negative for chest pain and palpitations.   Gastrointestinal: Negative for abdominal pain, blood in stool and constipation.   Skin: Negative for color change.   Allergic/Immunologic: Negative for environmental allergies.   Neurological: Negative for dizziness, speech difficulty and headaches.   Psychiatric/Behavioral: Negative for confusion. The patient is not nervous/anxious.      All other ROS reviewed and negative.    PMSFH:  The following portions of the patient's history were reviewed and updated as appropriate: allergies, current medications, past family history, past medical history, past social history, past surgical history and problem list.      Current Outpatient Medications:   •  lisinopril (PRINIVIL,ZESTRIL) 20 MG tablet, Take 1 tablet by mouth Daily. Take an additional dose if systolic >150 or diastolic >95, Disp: 60 tablet, Rfl: 5    VITALS:  /70 (BP Location: Right arm, Patient Position: Sitting, Cuff Size: Adult)   Pulse 74   Temp 97.2 °F (36.2 °C) (Temporal)   Ht 182 cm (71.65\")   Wt 88.3 kg (194 lb 9.6 oz)   BMI 26.65 kg/m²     Physical Exam   Constitutional: He is oriented to person, place, and time. He appears well-developed and well-nourished.   HENT:   Head: Normocephalic.   Right Ear: External ear normal.   Left Ear: External ear normal.   Mouth/Throat: Oropharynx is clear and moist.   Eyes: Conjunctivae and EOM are normal.   Neck: No JVD present.   Cardiovascular: Normal rate, regular rhythm and normal heart sounds.   Pulmonary/Chest: Effort normal and breath " sounds normal. No respiratory distress.   Abdominal: Soft. Bowel sounds are normal. There is no tenderness.   overweight   Musculoskeletal: He exhibits no edema.   Lymphadenopathy:     He has no cervical adenopathy.   Neurological: He is alert and oriented to person, place, and time.   Skin: Skin is warm and dry.   Psychiatric: He has a normal mood and affect. His behavior is normal.   Nursing note and vitals reviewed.      LABS:  Results for orders placed or performed in visit on 05/21/19   Lipid Panel   Result Value Ref Range    Total Cholesterol 152 0 - 200 mg/dL    Triglycerides 187 (H) 0 - 150 mg/dL    HDL Cholesterol 48 40 - 60 mg/dL    LDL Cholesterol  67 0 - 100 mg/dL    VLDL Cholesterol 37.4 5 - 40 mg/dL    LDL/HDL Ratio 1.39    Comprehensive Metabolic Panel   Result Value Ref Range    Glucose 96 65 - 99 mg/dL    BUN 8 6 - 20 mg/dL    Creatinine 0.82 0.76 - 1.27 mg/dL    Sodium 142 136 - 145 mmol/L    Potassium 4.7 3.5 - 5.2 mmol/L    Chloride 103 98 - 107 mmol/L    CO2 27.1 22.0 - 29.0 mmol/L    Calcium 9.3 8.6 - 10.5 mg/dL    Total Protein 6.6 6.0 - 8.5 g/dL    Albumin 4.80 3.50 - 5.20 g/dL    ALT (SGPT) 16 1 - 41 U/L    AST (SGOT) 19 1 - 40 U/L    Alkaline Phosphatase 59 39 - 117 U/L    Total Bilirubin 0.8 0.2 - 1.2 mg/dL    eGFR Non African Amer 103 >60 mL/min/1.73    Globulin 1.8 gm/dL    A/G Ratio 2.7 g/dL    BUN/Creatinine Ratio 9.8 7.0 - 25.0    Anion Gap 11.9 mmol/L   PSA Screen   Result Value Ref Range    PSA 0.698 0.000 - 4.000 ng/mL   TSH Rfx On Abnormal To Free T4   Result Value Ref Range    TSH 2.120 0.270 - 4.200 mIU/mL   Microalbumin / Creatinine Urine Ratio - Urine, Clean Catch   Result Value Ref Range    Microalbumin/Creatinine Ratio 12.0 mg/g    Creatinine, Urine 108.1 mg/dL    Microalbumin, Urine 1.3 mg/L   CBC Auto Differential   Result Value Ref Range    WBC 7.06 3.40 - 10.80 10*3/mm3    RBC 5.26 4.14 - 5.80 10*6/mm3    Hemoglobin 16.3 13.0 - 17.7 g/dL    Hematocrit 48.9 37.5 - 51.0 %     MCV 93.0 79.0 - 97.0 fL    MCH 31.0 26.6 - 33.0 pg    MCHC 33.3 31.5 - 35.7 g/dL    RDW 11.6 (L) 12.3 - 15.4 %    RDW-SD 39.4 37.0 - 54.0 fl    MPV 10.8 6.0 - 12.0 fL    Platelets 190 140 - 450 10*3/mm3    Neutrophil % 67.1 42.7 - 76.0 %    Lymphocyte % 24.4 19.6 - 45.3 %    Monocyte % 7.5 5.0 - 12.0 %    Eosinophil % 0.0 (L) 0.3 - 6.2 %    Basophil % 0.7 0.0 - 1.5 %    Immature Grans % 0.3 0.0 - 0.5 %    Neutrophils, Absolute 4.74 1.70 - 7.00 10*3/mm3    Lymphocytes, Absolute 1.72 0.70 - 3.10 10*3/mm3    Monocytes, Absolute 0.53 0.10 - 0.90 10*3/mm3    Eosinophils, Absolute 0.00 0.00 - 0.40 10*3/mm3    Basophils, Absolute 0.05 0.00 - 0.20 10*3/mm3    Immature Grans, Absolute 0.02 0.00 - 0.05 10*3/mm3    nRBC 0.0 0.0 - 0.2 /100 WBC         ECG 12 Lead  Date/Time: 5/22/2020 9:54 AM  Performed by: Keenan Jose MD  Authorized by: Keenan Jose MD   Comparison: compared with previous ECG from 5/19/2019  Rhythm: sinus bradycardia  Rate: bradycardic  QRS axis: normal  Comments: Stable overall ok EKG                 ASSESSMENT/PLAN:  Problem List Items Addressed This Visit        Cardiovascular and Mediastinum    Essential hypertension     Hypertension is improving with lifestyle modifications.  Continue current treatment regimen.  Dietary sodium restriction.  Weight loss.  Regular aerobic exercise.  Blood pressure will be reassessed in 3 months.         Relevant Orders    Comprehensive Metabolic Panel    Lipid Panel    Microalbumin / Creatinine Urine Ratio - Urine, Clean Catch       Nervous and Auditory    Abdominal pain, generalized     Overall improved and using konsyl. He will get a colonoscopy this summer and check labs.          Relevant Orders    CBC & Differential       Other    Preventative health care - Primary     He has good get up and go. His mood is good overall and overall stress. He runs 3 miles most days a week and does weight resistance. He will get an eye exam with an ophthalmology visit.  Age-appropriate Counseling:  Discussed preventative medicine issues with patient including regular exercise, healthy diet, stress reduction, adequate sleep and recommended age-appropriate screening studies.  Immunizations reviewed.     .            Other Visit Diagnoses     Screening for endocrine disorder        Relevant Orders    TSH Rfx On Abnormal To Free T4    Encounter for hepatitis C screening test for low risk patient        Relevant Orders    Hepatitis C Antibody    Screening PSA (prostate specific antigen)        Relevant Orders    PSA Screen          FOLLOW-UP:  No follow-ups on file.      Electronically signed by:    Keenan Jose MD           yes

## 2023-11-02 ENCOUNTER — OFFICE VISIT (OUTPATIENT)
Age: 48
End: 2023-11-02
Payer: COMMERCIAL

## 2023-11-02 VITALS
DIASTOLIC BLOOD PRESSURE: 80 MMHG | BODY MASS INDEX: 27.16 KG/M2 | SYSTOLIC BLOOD PRESSURE: 118 MMHG | HEIGHT: 71 IN | WEIGHT: 194 LBS

## 2023-11-02 DIAGNOSIS — M84.374A STRESS FRACTURE OF RIGHT CALCANEUS, INITIAL ENCOUNTER: ICD-10-CM

## 2023-11-02 DIAGNOSIS — M72.2 PLANTAR FASCIITIS, BILATERAL: Primary | ICD-10-CM

## 2023-11-02 NOTE — PROGRESS NOTES
[facility] [place] [facility] Office Visit     Office Visit       Date: 11/02/2023   Patient Name: Sylvain Lomeli  MRN: [id number]  YOB: 1975    Referring Physician: Referring, Self     Chief Complaint:   Chief Complaint   Patient presents with    Right Foot - Pain    Left Foot - Pain       History of Present Illness:   Sylvain Lomeli is a 48 y.o. male who presents with new problem of: bilateral foot pain.  Onset: atraumatic and gradual in nature. The issue has been ongoing for 2 year(s). Pain is a 2/10 on the pain scale. Pain is described as dull and aching. Associated symptoms include pain. The pain is worse with walking; resting improve the pain. Previous treatments have included: bracing.    Subjective   Review of Systems: Review of Systems   Constitutional:  Negative for chills, fever, unexpected weight gain and unexpected weight loss.   HENT:  Negative for congestion, postnasal drip and rhinorrhea.    Eyes:  Negative for blurred vision.   Respiratory:  Negative for shortness of breath.    Cardiovascular:  Negative for leg swelling.   Gastrointestinal:  Negative for abdominal pain, nausea and vomiting.   Genitourinary:  Negative for difficulty urinating.   Musculoskeletal:  Positive for arthralgias. Negative for gait problem, joint swelling and myalgias.   Skin:  Negative for skin lesions and wound.   Neurological:  Negative for dizziness, weakness, light-headedness and numbness.   Hematological:  Does not bruise/bleed easily.   Psychiatric/Behavioral:  Negative for depressed mood.    All other systems reviewed and are negative.       I have reviewed the following portions of the patient's history:History of Present Illness and review of systems.    Past Medical History:   Past Medical History:   Diagnosis Date    COVID 2021    Diverticulosis 1/27/2023    3/2022 per Dr garduno and ok with konsyl     High blood pressure     Internal hemorrhoids     anal  "fissure    Knee pain        Past Surgical History:   Past Surgical History:   Procedure Laterality Date    COLONOSCOPY  2006    DR Ndiaye    KNEE SURGERY         Family History:   Family History   Problem Relation Age of Onset    Hypertension Father     Breast cancer Other        Social History:   Social History     Socioeconomic History    Marital status:    Tobacco Use    Smoking status: Former     Packs/day: 20.00     Years: 5.00     Additional pack years: 0.00     Total pack years: 100.00     Types: Cigarettes     Quit date:      Years since quittin.8    Smokeless tobacco: Never   Vaping Use    Vaping Use: Never used   Substance and Sexual Activity    Alcohol use: Not Currently     Alcohol/week: 3.0 standard drinks of alcohol     Types: 3 Cans of beer per week    Drug use: Never       Medications:   Current Outpatient Medications:     lisinopril (PRINIVIL,ZESTRIL) 20 MG tablet, Take 1 tablet by mouth Every Night., Disp: 90 tablet, Rfl: 3    Allergies:   Allergies   Allergen Reactions    Penicillins Hives and Rash       I reviewed the patient's chief complaint, history of present illness, review of systems, past medical history, surgical history, family history, social history, medications and allergy list.     Objective    Vital Signs:   Vitals:    23 1452   BP: 118/80   Weight: 88 kg (194 lb)   Height: 181 cm (71.25\")     Body mass index is 26.87 kg/m².   BMI is >= 25 and <30. (Overweight) The following options were offered after discussion;: exercise counseling/recommendations and nutrition counseling/recommendations     Patient reports that he is a former smoker. He quit smoking in .  He has not resumed smoking since that time.  This behavior was applauded and she was encouraged to continue in smoking cessation.  We will continue to monitor at subsequent visits.    Ortho Exam:  Constitutional: General Appearance: healthy-appearing, NAD, and normal body habitus.   Psychiatric: " Orientation: oriented to time, place, and person. Mood and Affect: normal mood and affect and active and alert.   Gait and Station: Appearance: ambulating with no assistive devices and limp.   Skin: Right Lower Extremity: normal. Left Lower Extremity: normal.   Bilateral foot exam:   Normal foot contour without evidence of swelling ecchymosis or erythema.   Negative abrasion or ulceration.   Sensation grossly intact to all digits upon her foot and dorsum of the foot.   Mobility in all toes present.   Refill less than 2 seconds.   Dorsalis pedis pulse intact.   Mild Positive tenderness at the calcaneal insertion of the plantar fascia.   Most tender over medial and lateral borders of calcaneus.  Positive calcaneal squeeze test.   dorsiflexion of the ankle 5° with the knee flexed and a 0° with the knee extended.   Negative tenderness at the Achilles tendon.   full hip and knee motion    Results Review:   Imaging Results (Last 24 Hours)       ** No results found for the last 24 hours. **            Procedures    Assessment / Plan    Assessment/Plan:   Diagnoses and all orders for this visit:    1. Plantar fasciitis, bilateral (Primary)    2. 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  He has made numerous commendations since last being seen in 2021.  He has been doing less physical activity.  He has been doing numerous stretches for presumed plantar fasciitis.  He has occasionally used immobilization.  With the reduced activity level, his pain has improved.  However, if he increases his physical activity, he gets much worsening of pain to the level of limping.  He is tender at the plantar fascia insertion onto the calcaneus and the plantar aspect of the foot but this is not his most significant pain.  He is most tender on the medial  and lateral aspects of his calcaneus.  This worsens with increased weightbearing activity.  He does not have pain that is worse first thing in the morning.  I am concerned that he may have developed a calcaneal stress fracture.  I would obtain radiographs today of the bilateral feet.  Plan to then obtain MRI of the right foot to evaluate further for the stress fracture.  We will place him into a heel cup today and continue recommend activity modification.  Pending the results of the MRI, we may place him on vitamin D supplementation.  I will see him back after the MRI discuss results and next Epson treatment.    Previous documentation reviewed: 1/27/2023-Peter Jose MD-office visit.    Previous laboratory results reviewed: 1/27/2023-creatinine 0.89, EGFR 107.0.  TSH 1.110.    Follow Up:   Return for MRI RIGHT FOOT REVIEW.      Sascha Avalos MD  [facility] [place] [facility]

## 2023-11-03 ENCOUNTER — TELEPHONE (OUTPATIENT)
Dept: ORTHOPEDIC SURGERY | Facility: CLINIC | Age: 48
End: 2023-11-03
Payer: COMMERCIAL

## 2023-11-03 NOTE — TELEPHONE ENCOUNTER
Left voicemail for patient. Order has been signed and faxed. OK for hub or front office to relay message. Farzaneh Car CMA

## 2023-11-03 NOTE — TELEPHONE ENCOUNTER
Caller: Sylvain Lomeli    Relationship: Self    Best call back number: [phone]    What orders are you requesting (i.e. lab or imaging): XRAY    In what timeframe would the patient need to come in: ASAP    Where will you receive your lab/imaging services: [facility]  [phone]    Additional notes: ORDER WAS SENT BUT NOT SIGNED PLEASE SIGN AND RE FAX

## 2023-11-22 DIAGNOSIS — M72.2 PLANTAR FASCIITIS, BILATERAL: ICD-10-CM

## 2023-11-28 ENCOUNTER — TELEPHONE (OUTPATIENT)
Dept: ORTHOPEDIC SURGERY | Facility: CLINIC | Age: 48
End: 2023-11-28
Payer: COMMERCIAL

## 2023-11-28 NOTE — TELEPHONE ENCOUNTER
I would stick with just right for now given that most of his symptoms are there and we are trying to rule out a stress fracture.

## 2023-11-28 NOTE — TELEPHONE ENCOUNTER
I left a voicemail letting the patient know what Dr. Avalos said and to call back with any further questions or concerns he may have.  Martha Walden RT (R), ROT

## 2023-11-28 NOTE — TELEPHONE ENCOUNTER
Patient recently had updated bilat feet xrays. Dr Avalos had ordered a right foot MRI, and patient just wanted to be sure that he only needs the right foot MRI and not bilat.     Dr Avalos or team please advise.

## 2023-12-21 ENCOUNTER — OFFICE VISIT (OUTPATIENT)
Age: 48
End: 2023-12-21
Payer: COMMERCIAL

## 2023-12-21 VITALS
HEIGHT: 71 IN | BODY MASS INDEX: 27.58 KG/M2 | WEIGHT: 197 LBS | DIASTOLIC BLOOD PRESSURE: 76 MMHG | SYSTOLIC BLOOD PRESSURE: 120 MMHG

## 2023-12-21 DIAGNOSIS — M19.071 OSTEOARTHRITIS OF RIGHT MIDFOOT: ICD-10-CM

## 2023-12-21 DIAGNOSIS — M95.8 OSTEOCHONDRAL DEFECT OF ANKLE: ICD-10-CM

## 2023-12-21 DIAGNOSIS — M72.2 PLANTAR FASCIITIS, BILATERAL: Primary | ICD-10-CM

## 2023-12-21 DIAGNOSIS — M67.40 GANGLION CYST: ICD-10-CM

## 2023-12-21 NOTE — PROGRESS NOTES
[facility] [place] [facility] Office Follow Up Visit     Office Follow Up      Date: 12/21/2023   Patient Name: Sylvain Lomeli  MRN: [id number]  YOB: 1975    Referring Physician: No ref. provider found     Chief Complaint:   Chief Complaint   Patient presents with    Follow-up     7 week MRI follow up -- MRI done 12/12/23; Plantar fasciitis,Bilateral; Stress fracture of right calcaneus     History of Present Illness: Sylvain Lomeli is a 48 y.o. male who is here today for follow up on bilateral foot pain worse on the right compared to the left.  He has been dealing with this for 2 years.  He has done various nonoperative treatment strategies including physical therapy, stretching, nighttime splints, anti-inflammatory medication.  This did not provide significant relief.  He has not been able to return to return to desired physical activity.  MRI of the right ankle has been obtained.  He is here today to discuss those results.    Subjective   Review of Systems: Review of Systems   Constitutional:  Negative for chills, fever, unexpected weight gain and unexpected weight loss.   HENT:  Negative for congestion, postnasal drip and rhinorrhea.    Eyes:  Negative for blurred vision.   Respiratory:  Negative for shortness of breath.    Cardiovascular:  Negative for leg swelling.   Gastrointestinal:  Negative for abdominal pain, nausea and vomiting.   Genitourinary:  Negative for difficulty urinating.   Musculoskeletal:  Positive for arthralgias. Negative for gait problem, joint swelling and myalgias.   Skin:  Negative for skin lesions and wound.   Neurological:  Negative for dizziness, weakness, light-headedness and numbness.   Hematological:  Does not bruise/bleed easily.   Psychiatric/Behavioral:  Negative for depressed mood.         Medications:   Current Outpatient Medications:     lisinopril (PRINIVIL,ZESTRIL) 20 MG tablet, Take 1 tablet by mouth Every Night., Disp: 90  "tablet, Rfl: 3    Allergies:   Allergies   Allergen Reactions    Penicillins Hives and Rash       I have reviewed and updated the patient's chief complaint, history of present illness, review of systems, past medical history, surgical history, family history, social history, medications and allergy list as appropriate.     Objective    Vital Signs:   Vitals:    12/21/23 1439   BP: 120/76   Weight: 89.4 kg (197 lb)   Height: 181 cm (71.26\")     Body mass index is 27.28 kg/m².  BMI is >= 25 and <30. (Overweight) The following options were offered after discussion;: exercise counseling/recommendations and nutrition counseling/recommendations     Patient reports that he is a former smoker. He quit smoking in 1997.  He has not resumed smoking since that time.  This behavior was applauded and she was encouraged to continue in smoking cessation.  We will continue to monitor at subsequent visits.    Ortho Exam:  Right foot: Tender at the medial calcaneal tubercle as well as the medial and lateral sides of the calcaneus.  Positive calcaneal squeeze.  No erythema ecchymosis or swelling present.  He is nontender at the lateral or anterior ankle.  Nontender over the dorsal midfoot.  He has full range of motion in the foot and ankle.  5/5 strength.  Sensations intact to light touch.    Results Review:   Imaging Results (Last 24 Hours)       ** No results found for the last 24 hours. **        MRI of the right foot from 12/12/2023 personally reviewed and interpreted by me.  Plantar calcaneal enthesophyte noted with increased signal from plantar fasciitis.  Edema noted surrounding there within the calcaneus.  Degenerative changes are noted with the tibiotalar as well as midfoot joints of the navicular medial cuneiform.  Osteochondral defects are also noted here.  Resulting from that has been a 2.3 cm ganglion cyst in the dorsal midfoot and anterior ankle.  Multiple ligament tears of the ankle are noted from prior " sprains.    Procedures    Assessment / Plan    Assessment/Plan:   Diagnoses and all orders for this visit:    1. Plantar fasciitis, bilateral (Primary)    2. Osteochondral defect of ankle    3. Ganglion cyst    4. Osteoarthritis of right midfoot      Persistent pain over the plantar aspect of the heel.  MRI results are consistent with a diagnosis of plantar fasciitis.  He has other incidental findings including degenerative changes within the midfoot, osteochondral defects, and a ganglion cyst.  He is not tender in any other aspect of his foot or ankle.  He is not exhibiting signs or symptoms of ankle instability.  History, exam, and now imaging findings all point towards the plantar fascia as a source of his symptoms.  No evidence of calcaneal stress fracture.  However, he has not made significant improvement despite appropriate nonoperative management of his plantar fasciitis.  It is my opinion that his other findings on MRI are not contributing to the pain that he is experiencing.  However, I do not think that additional management under my care would significantly alter his clinical course and get him back to his desired physical activity.  I do think it is valuable to get the opinion of our foot and ankle specialist at this time.  I will arrange for referral to see Dr. Resendiz.  I am happy to see the patient back at any point to talk further, answer questions, or address any other orthopedic need.    Follow Up:   Return for F/U with Astrid.      Sascha Avalos MD  [facility] [place] [facility]

## 2024-01-08 ENCOUNTER — OFFICE VISIT (OUTPATIENT)
Dept: ORTHOPEDIC SURGERY | Facility: CLINIC | Age: 49
End: 2024-01-08
Payer: COMMERCIAL

## 2024-01-08 VITALS
SYSTOLIC BLOOD PRESSURE: 128 MMHG | DIASTOLIC BLOOD PRESSURE: 90 MMHG | HEIGHT: 71 IN | WEIGHT: 197.09 LBS | BODY MASS INDEX: 27.59 KG/M2

## 2024-01-08 DIAGNOSIS — M79.671 PAIN OF RIGHT HEEL: Primary | ICD-10-CM

## 2024-01-08 PROCEDURE — 99214 OFFICE O/P EST MOD 30 MIN: CPT | Performed by: ORTHOPAEDIC SURGERY

## 2024-01-08 NOTE — PROGRESS NOTES
[facility] [place] [facility] Office Visit     Office Visit       Date: 01/08/2024   Patient Name: Sylvain Lomeli  MRN: [id number]  YOB: 1975    Referring Physician: No ref. provider found     Chief Complaint:   Chief Complaint   Patient presents with    Right Foot - Pain       History of Present Illness: Sylvain Lomeli is a 48 y.o. male who is here today with right heel pain.  States pain has been going on for couple of years.  Pain described as dull and aching.  Feels like a bruise on the heel.  States rest makes it better.  Worse with extensive walking or running.  Patient reports he likes hiking running and is an avid Frisbee golf player.  Has tried rest as well as wearing a brace.  Works sitting at a desk.  Denies smoking.  No other complaints.    Subjective   Review of Systems: Review of Systems   Constitutional:  Negative for chills, fever, unexpected weight gain and unexpected weight loss.   HENT:  Negative for congestion, postnasal drip and rhinorrhea.    Eyes:  Negative for blurred vision.   Respiratory:  Negative for shortness of breath.    Cardiovascular:  Negative for leg swelling.   Gastrointestinal:  Negative for abdominal pain, nausea and vomiting.   Genitourinary:  Negative for difficulty urinating.   Musculoskeletal:  Positive for arthralgias. Negative for gait problem, joint swelling and myalgias.   Skin:  Negative for skin lesions and wound.   Neurological:  Negative for dizziness, weakness, light-headedness and numbness.   Hematological:  Does not bruise/bleed easily.   Psychiatric/Behavioral:  Negative for depressed mood.         Past Medical History:   Past Medical History:   Diagnosis Date    COVID     2021    Diverticulosis 1/27/2023    3/2022 per Dr garduno and ok with konsyl     High blood pressure     Internal hemorrhoids     anal fissure    Knee pain        Past Surgical History:   Past Surgical History:   Procedure Laterality Date     "COLONOSCOPY  2006    DR Ndiaye    KNEE SURGERY         Family History:   Family History   Problem Relation Age of Onset    Hypertension Father     Breast cancer Other        Social History:   Social History     Socioeconomic History    Marital status:    Tobacco Use    Smoking status: Former     Packs/day: 20.00     Years: 5.00     Additional pack years: 0.00     Total pack years: 100.00     Types: Cigarettes     Quit date:      Years since quittin.0    Smokeless tobacco: Never   Vaping Use    Vaping Use: Never used   Substance and Sexual Activity    Alcohol use: Not Currently     Alcohol/week: 3.0 standard drinks of alcohol     Types: 3 Cans of beer per week    Drug use: Never       Medications:   Current Outpatient Medications:     lisinopril (PRINIVIL,ZESTRIL) 20 MG tablet, Take 1 tablet by mouth Every Night., Disp: 90 tablet, Rfl: 3    Allergies:   Allergies   Allergen Reactions    Penicillins Hives and Rash       I reviewed the patient's chief complaint, history of present illness, review of systems, past medical history, surgical history, family history, social history, medications and allergy list.     Objective    Vital Signs:   Vitals:    24 1529   BP: 128/90   Weight: 89.4 kg (197 lb 1.5 oz)   Height: 181 cm (71.26\")     Body mass index is 27.29 kg/m².    Ortho Exam:  right LE Foot and Ankle Exam:   Mildly antalgic gait pattern. Hindfoot alignment is neutral. Plantigrade foot.   Neurological exam of the superficial peroneal, deep peroneal, plantar, sural and saphenous nerves demonstrates intact sensation and normal motor function.   There is good perfusion to the toes.   The skin is intact throughout the foot and ankle without ulceration.   Range of motion of ankle, subtalar joint, midfoot and toes is within normal limits.   There is tenderness to palpation at the plantar aspect of the posteromedial heel proximal to the insertion of the plantar fascia site.  Is primarily over the " medial tubercle.    Results Review:   01/08/24 I have personally reviewed and interpreted the images from outside facility with the documented findings, MRI from [facility] from December 12 over the right hindfoot reviewed, there is some thickening of the medial plantar fascia likely consistent to remote history of Planter fasciitis I also do see some signal changes consistent with fluid around the medial tubercle of the inferior calcaneus      Assessment / Plan    Assessment/Plan:   Diagnoses and all orders for this visit:    1. Pain of right heel (Primary)      Discussed chronic right heel pain which has been present for over a year causing pain that has progressed (a chronic illness with exacerbation). Decision regarding surgical intervention considered. Patient is not a candidate due to trial of nonoperative management.  Long discussion had with patient regarding his pain.  His pain seems a little bit more isolated to the tubercle of the calcaneus more so than the regular area of Planter fasciitis.  I think this correlates to some signal changes on the MRI.  After further discussion with patient it seems as though it is likely repetitive heel trauma that is undergone primarily when he plays disc golf.  He and I both agree that this would explain much of his symptoms.  We also had a long discussion that I think that if he were to refrain from activities that reproduce his pain that this area would eventually heal and his pain would subside.  Plan will be for patient to be weightbearing as tolerated in a tall cam walking boot for the next 8 weeks at which time we will follow-up with me in clinic for reevaluation.  Did tell patient that if his symptoms persist with ambulation and walking boot he is to contact the clinic and we need to either make him nonweightbearing or place him into a walking cast with the goal that he should not be experiencing pain for the next 8 weeks in order to give his  tissues and adequate time to heal.  Patient expressed agreement with plan and understanding.  Will plan to see him back in 8 weeks for reevaluation.  Was a pleasure seeing him today.    Follow Up:   Return if symptoms worsen or fail to improve.      Fortino Resendiz MD  [facility] [place] Orth[facility] Surgeon

## 2024-02-02 ENCOUNTER — OFFICE VISIT (OUTPATIENT)
Dept: INTERNAL MEDICINE | Facility: CLINIC | Age: 49
End: 2024-02-02
Payer: COMMERCIAL

## 2024-02-02 ENCOUNTER — LAB (OUTPATIENT)
Dept: LAB | Facility: HOSPITAL | Age: 49
End: 2024-02-02
Payer: COMMERCIAL

## 2024-02-02 VITALS
HEIGHT: 71 IN | SYSTOLIC BLOOD PRESSURE: 112 MMHG | HEART RATE: 68 BPM | DIASTOLIC BLOOD PRESSURE: 78 MMHG | BODY MASS INDEX: 28.14 KG/M2 | TEMPERATURE: 97.1 F | WEIGHT: 201 LBS

## 2024-02-02 DIAGNOSIS — Z12.5 SCREENING PSA (PROSTATE SPECIFIC ANTIGEN): ICD-10-CM

## 2024-02-02 DIAGNOSIS — M84.374A STRESS FRACTURE OF RIGHT FOOT, INITIAL ENCOUNTER: ICD-10-CM

## 2024-02-02 DIAGNOSIS — E78.1 HYPERTRIGLYCERIDEMIA: ICD-10-CM

## 2024-02-02 DIAGNOSIS — K59.00 CONSTIPATION, UNSPECIFIED CONSTIPATION TYPE: ICD-10-CM

## 2024-02-02 DIAGNOSIS — I10 ESSENTIAL HYPERTENSION: ICD-10-CM

## 2024-02-02 DIAGNOSIS — D22.9 MULTIPLE NEVI: ICD-10-CM

## 2024-02-02 DIAGNOSIS — Z00.00 PREVENTATIVE HEALTH CARE: Primary | ICD-10-CM

## 2024-02-02 LAB
25(OH)D3 SERPL-MCNC: 26.8 NG/ML (ref 30–100)
ALBUMIN SERPL-MCNC: 4.8 G/DL (ref 3.5–5.2)
ALBUMIN UR-MCNC: <1.2 MG/DL
ALBUMIN/GLOB SERPL: 3 G/DL
ALP SERPL-CCNC: 59 U/L (ref 39–117)
ALT SERPL W P-5'-P-CCNC: 23 U/L (ref 1–41)
ANION GAP SERPL CALCULATED.3IONS-SCNC: 10 MMOL/L (ref 5–15)
AST SERPL-CCNC: 24 U/L (ref 1–40)
BASOPHILS # BLD AUTO: 0.07 10*3/MM3 (ref 0–0.2)
BASOPHILS NFR BLD AUTO: 1.2 % (ref 0–1.5)
BILIRUB SERPL-MCNC: 0.6 MG/DL (ref 0–1.2)
BUN SERPL-MCNC: 8 MG/DL (ref 6–20)
BUN/CREAT SERPL: 8 (ref 7–25)
CALCIUM SPEC-SCNC: 9.5 MG/DL (ref 8.6–10.5)
CHLORIDE SERPL-SCNC: 103 MMOL/L (ref 98–107)
CHOLEST SERPL-MCNC: 144 MG/DL (ref 0–200)
CO2 SERPL-SCNC: 28 MMOL/L (ref 22–29)
CREAT SERPL-MCNC: 1 MG/DL (ref 0.76–1.27)
CREAT UR-MCNC: 112.1 MG/DL
DEPRECATED RDW RBC AUTO: 39.8 FL (ref 37–54)
EGFRCR SERPLBLD CKD-EPI 2021: 93.4 ML/MIN/1.73
EOSINOPHIL # BLD AUTO: 0.14 10*3/MM3 (ref 0–0.4)
EOSINOPHIL NFR BLD AUTO: 2.3 % (ref 0.3–6.2)
ERYTHROCYTE [DISTWIDTH] IN BLOOD BY AUTOMATED COUNT: 12 % (ref 12.3–15.4)
GLOBULIN UR ELPH-MCNC: 1.6 GM/DL
GLUCOSE SERPL-MCNC: 94 MG/DL (ref 65–99)
HCT VFR BLD AUTO: 45.5 % (ref 37.5–51)
HDLC SERPL-MCNC: 37 MG/DL (ref 40–60)
HGB BLD-MCNC: 15.7 G/DL (ref 13–17.7)
IMM GRANULOCYTES # BLD AUTO: 0.02 10*3/MM3 (ref 0–0.05)
IMM GRANULOCYTES NFR BLD AUTO: 0.3 % (ref 0–0.5)
LDLC SERPL CALC-MCNC: 79 MG/DL (ref 0–100)
LDLC/HDLC SERPL: 2.02 {RATIO}
LYMPHOCYTES # BLD AUTO: 1.82 10*3/MM3 (ref 0.7–3.1)
LYMPHOCYTES NFR BLD AUTO: 29.9 % (ref 19.6–45.3)
MCH RBC QN AUTO: 31.6 PG (ref 26.6–33)
MCHC RBC AUTO-ENTMCNC: 34.5 G/DL (ref 31.5–35.7)
MCV RBC AUTO: 91.5 FL (ref 79–97)
MICROALBUMIN/CREAT UR: NORMAL MG/G{CREAT}
MONOCYTES # BLD AUTO: 0.5 10*3/MM3 (ref 0.1–0.9)
MONOCYTES NFR BLD AUTO: 8.2 % (ref 5–12)
NEUTROPHILS NFR BLD AUTO: 3.53 10*3/MM3 (ref 1.7–7)
NEUTROPHILS NFR BLD AUTO: 58.1 % (ref 42.7–76)
NRBC BLD AUTO-RTO: 0 /100 WBC (ref 0–0.2)
PLATELET # BLD AUTO: 194 10*3/MM3 (ref 140–450)
PMV BLD AUTO: 10.6 FL (ref 6–12)
POTASSIUM SERPL-SCNC: 4.5 MMOL/L (ref 3.5–5.2)
PROT SERPL-MCNC: 6.4 G/DL (ref 6–8.5)
PSA SERPL-MCNC: 0.71 NG/ML (ref 0–4)
RBC # BLD AUTO: 4.97 10*6/MM3 (ref 4.14–5.8)
SODIUM SERPL-SCNC: 141 MMOL/L (ref 136–145)
TRIGL SERPL-MCNC: 162 MG/DL (ref 0–150)
TSH SERPL DL<=0.05 MIU/L-ACNC: 1.23 UIU/ML (ref 0.27–4.2)
VLDLC SERPL-MCNC: 28 MG/DL (ref 5–40)
WBC NRBC COR # BLD AUTO: 6.08 10*3/MM3 (ref 3.4–10.8)

## 2024-02-02 PROCEDURE — 80061 LIPID PANEL: CPT

## 2024-02-02 PROCEDURE — G0103 PSA SCREENING: HCPCS

## 2024-02-02 PROCEDURE — 82043 UR ALBUMIN QUANTITATIVE: CPT

## 2024-02-02 PROCEDURE — 82570 ASSAY OF URINE CREATININE: CPT

## 2024-02-02 PROCEDURE — 82306 VITAMIN D 25 HYDROXY: CPT

## 2024-02-02 PROCEDURE — 80050 GENERAL HEALTH PANEL: CPT

## 2024-02-02 PROCEDURE — 99396 PREV VISIT EST AGE 40-64: CPT | Performed by: INTERNAL MEDICINE

## 2024-02-02 NOTE — ASSESSMENT & PLAN NOTE
Hypertension is improving with lifestyle modifications.  Continue current treatment regimen.  Weight loss.  Regular aerobic exercise.  Blood pressure will be reassessed at the next regular appointment.

## 2024-02-02 NOTE — PROGRESS NOTES
"Chief Complaint   Patient presents with    Annual Exam     fasting    Constipation       History of Present Illness  48 y.o. male presents for wellness exam. He has some bloating and constipation without pain     Review of Systems   Constitutional:  Negative for chills and fever.   Respiratory:  Negative for cough and shortness of breath.    Cardiovascular:  Negative for chest pain and palpitations.   Gastrointestinal:  Positive for constipation. Negative for abdominal pain, nausea and vomiting.   Musculoskeletal:  Positive for arthralgias.   Skin:  Negative for rash.   Neurological:  Negative for dizziness, light-headedness and headaches.   Psychiatric/Behavioral:  Negative for decreased concentration and dysphoric mood.    All other systems reviewed and are negative.    .    PMSFH:  The following portions of the patient's history were reviewed and updated as appropriate: allergies, current medications, past family history, past medical history, past social history, past surgical history and problem list.      Current Outpatient Medications:     lisinopril (PRINIVIL,ZESTRIL) 20 MG tablet, Take 1 tablet by mouth Every Night., Disp: 90 tablet, Rfl: 3    VITALS:  /78   Pulse 68   Temp 97.1 °F (36.2 °C)   Ht 181 cm (71.26\")   Wt 91.2 kg (201 lb)   BMI 27.83 kg/m²     Physical Exam  Vitals and nursing note reviewed.   Constitutional:       Appearance: Normal appearance. He is well-developed.   HENT:      Head: Normocephalic.      Right Ear: Tympanic membrane and ear canal normal.      Left Ear: Tympanic membrane and ear canal normal.   Eyes:      Extraocular Movements: Extraocular movements intact.      Conjunctiva/sclera: Conjunctivae normal.   Neck:      Vascular: No carotid bruit.   Cardiovascular:      Rate and Rhythm: Normal rate and regular rhythm.   Pulmonary:      Effort: Pulmonary effort is normal. No respiratory distress.      Breath sounds: Normal breath sounds.   Abdominal:      General: Bowel " "sounds are normal.      Palpations: Abdomen is soft.      Tenderness: There is no abdominal tenderness. There is no guarding.   Genitourinary:     Testes: Normal.   Skin:     General: Skin is warm and dry.      Comments: Scattered nevi none in particular of concern   Neurological:      General: No focal deficit present.      Mental Status: He is alert and oriented to person, place, and time.   Psychiatric:         Mood and Affect: Mood normal.         Behavior: Behavior normal.         LABS:    CMP:  Lab Results   Component Value Date    BUN 8 01/27/2023    CREATININE 0.89 01/27/2023    EGFRIFNONA 99 01/21/2022     01/27/2023    K 4.5 01/27/2023     01/27/2023    CALCIUM 9.2 01/27/2023    ALBUMIN 4.2 01/27/2023    BILITOT 0.6 01/27/2023    ALKPHOS 61 01/27/2023    AST 23 01/27/2023    ALT 20 01/27/2023     CBC:  Lab Results   Component Value Date    WBC 6.21 01/27/2023    RBC 5.00 01/27/2023    HGB 15.7 01/27/2023    HCT 46.5 01/27/2023    MCV 93.0 01/27/2023    MCH 31.4 01/27/2023    MCHC 33.8 01/27/2023    RDW 12.2 (L) 01/27/2023     01/27/2023     LIPID PANEL:  Lab Results   Component Value Date    TRIG 165 (H) 01/27/2023    HDL 41 01/27/2023    VLDL 28 01/27/2023    LDL 68 01/27/2023    LDLHDL 1.54 01/27/2023     HGBA1C(LAST 2):No results found for: \"HGBA1C\"  Procedures         ASSESSMENT/PLAN:  Diagnoses and all orders for this visit:    1. Preventative health care (Primary)  Assessment & Plan:  He will get Derm exam and mood good overall. Age-appropriate Counseling:  Discussed preventative medicine issues with patient including regular exercise, healthy diet, stress reduction, adequate sleep and recommended age-appropriate screening studies.  Immunizations reviewed.        Orders:  -     Ambulatory Referral to Dermatology    2. Essential hypertension  Assessment & Plan:  Hypertension is improving with lifestyle modifications.  Continue current treatment regimen.  Weight loss.  Regular " aerobic exercise.  Blood pressure will be reassessed at the next regular appointment.    Orders:  -     Microalbumin / Creatinine Urine Ratio - Urine, Clean Catch; Future    3. Hypertriglyceridemia  Assessment & Plan:  Lipid abnormalities are improving with lifestyle modifications.  Nutritional counseling was provided.  Lipids will be reassessed in 1 year.    Orders:  -     CBC & Differential; Future  -     Comprehensive Metabolic Panel; Future  -     Lipid Panel; Future  -     TSH Rfx On Abnormal To Free T4; Future    4. Stress fracture of right foot, initial encounter  Comments:  Check labs and follow with Ortho  Orders:  -     Vitamin D,25-Hydroxy; Future    5. Screening PSA (prostate specific antigen)  -     PSA Screen; Future    6. Constipation, unspecified constipation type  -     TSH Rfx On Abnormal To Free T4; Future    7. Multiple nevi  -     Ambulatory Referral to Dermatology        FOLLOW-UP:  Return in about 1 year (around 2/2/2025) for Annual physical, Labs this visit.      Electronically signed by:    Keenan Jose MD

## 2024-02-02 NOTE — ASSESSMENT & PLAN NOTE
He will get Derm exam and mood good overall. Age-appropriate Counseling:  Discussed preventative medicine issues with patient including regular exercise, healthy diet, stress reduction, adequate sleep and recommended age-appropriate screening studies.  Immunizations reviewed.

## 2024-02-13 DIAGNOSIS — Z00.00 PREVENTATIVE HEALTH CARE: ICD-10-CM

## 2024-02-13 RX ORDER — LISINOPRIL 20 MG/1
20 TABLET ORAL NIGHTLY
Qty: 90 TABLET | Refills: 3 | Status: SHIPPED | OUTPATIENT
Start: 2024-02-13

## 2024-03-04 ENCOUNTER — OFFICE VISIT (OUTPATIENT)
Dept: ORTHOPEDIC SURGERY | Facility: CLINIC | Age: 49
End: 2024-03-04
Payer: COMMERCIAL

## 2024-03-04 VITALS
BODY MASS INDEX: 27.47 KG/M2 | WEIGHT: 196.2 LBS | HEIGHT: 71 IN | SYSTOLIC BLOOD PRESSURE: 136 MMHG | DIASTOLIC BLOOD PRESSURE: 88 MMHG

## 2024-03-04 DIAGNOSIS — S99.921D INJURY OF RIGHT HEEL, SUBSEQUENT ENCOUNTER: Primary | ICD-10-CM

## 2024-03-04 PROBLEM — S99.921A INJURY OF RIGHT HEEL: Status: ACTIVE | Noted: 2024-03-04

## 2024-03-04 PROCEDURE — 99213 OFFICE O/P EST LOW 20 MIN: CPT | Performed by: ORTHOPAEDIC SURGERY

## 2024-03-04 NOTE — PROGRESS NOTES
"                          [facility] [place] [facility] Office Follow Up     Office Follow Up Visit     Date: 03/04/2024   Patient Name: Sylvain Lomeli  MRN: [id number]  YOB: 1975  Chief Complaint:   Chief Complaint   Patient presents with    Follow-up     8 week follow up- Pain of right heel     History of Present Illness:   Sylvain Lomeli is a 48 y.o. male who is here today for follow up for right heel injury.  Last seen in clinic approximately 8 weeks ago.  States pain is much improved.  Has not wearing cam walking boot for all weightbearing activity for the last 8 weeks.  States that has been the key to his pain subsiding.   has not put any Frisbee golf since and has also not been running.    Subjective   I reviewed the patient's chief complaint, history of present illness, review of systems, past medical history, surgical history, family history, social history, medications and allergy list   Objective    Vital Signs:   Vitals:    03/04/24 1509   BP: 136/88   Weight: 89 kg (196 lb 3.2 oz)   Height: 181 cm (71.26\")     Body mass index is 27.17 kg/m².    Ortho Exam:  right LE Foot and Ankle Exam:   Non antalgic gait pattern.   There is no tenderness to palpation over heel.    Results Review:  No new imaging    Assessment / Plan    Assessment/Plan:   Diagnoses and all orders for this visit:    1. Injury of right heel, subsequent encounter (Primary)      Returns to clinic for 2-month follow-up following heel injury.  States pain much improved.  Counseled patient he can now discontinue cam walking boot and slowly increase activities as able.  Counseled patient to continue to avoid activities that cause pain at his previous injury site.  Will plan to see him back on an as-needed basis.    Follow Up:   Return if symptoms worsen or fail to improve.      Fortino Resendiz MD  [facility] [place] Orthopedic Surgeon   "

## 2025-02-07 ENCOUNTER — LAB (OUTPATIENT)
Dept: LAB | Facility: HOSPITAL | Age: 50
End: 2025-02-07
Payer: COMMERCIAL

## 2025-02-07 ENCOUNTER — OFFICE VISIT (OUTPATIENT)
Dept: INTERNAL MEDICINE | Facility: CLINIC | Age: 50
End: 2025-02-07
Payer: COMMERCIAL

## 2025-02-07 VITALS
HEART RATE: 58 BPM | SYSTOLIC BLOOD PRESSURE: 122 MMHG | DIASTOLIC BLOOD PRESSURE: 88 MMHG | BODY MASS INDEX: 28 KG/M2 | WEIGHT: 200 LBS | TEMPERATURE: 97.7 F | HEIGHT: 71 IN

## 2025-02-07 DIAGNOSIS — Z12.5 SCREENING PSA (PROSTATE SPECIFIC ANTIGEN): ICD-10-CM

## 2025-02-07 DIAGNOSIS — I10 ESSENTIAL HYPERTENSION: ICD-10-CM

## 2025-02-07 DIAGNOSIS — E55.9 VITAMIN D DEFICIENCY: ICD-10-CM

## 2025-02-07 DIAGNOSIS — Z00.00 PREVENTATIVE HEALTH CARE: Primary | ICD-10-CM

## 2025-02-07 DIAGNOSIS — E78.1 HYPERTRIGLYCERIDEMIA: ICD-10-CM

## 2025-02-07 DIAGNOSIS — E66.3 OVERWEIGHT (BMI 25.0-29.9): ICD-10-CM

## 2025-02-07 LAB
25(OH)D3 SERPL-MCNC: 23.2 NG/ML (ref 30–100)
ALBUMIN SERPL-MCNC: 4.1 G/DL (ref 3.5–5.2)
ALBUMIN UR-MCNC: <1.2 MG/DL
ALBUMIN/GLOB SERPL: 1.9 G/DL
ALP SERPL-CCNC: 64 U/L (ref 39–117)
ALT SERPL W P-5'-P-CCNC: 25 U/L (ref 1–41)
ANION GAP SERPL CALCULATED.3IONS-SCNC: 7.3 MMOL/L (ref 5–15)
AST SERPL-CCNC: 23 U/L (ref 1–40)
BASOPHILS # BLD AUTO: 0.06 10*3/MM3 (ref 0–0.2)
BASOPHILS NFR BLD AUTO: 1 % (ref 0–1.5)
BILIRUB SERPL-MCNC: 0.6 MG/DL (ref 0–1.2)
BUN SERPL-MCNC: 8 MG/DL (ref 6–20)
BUN/CREAT SERPL: 9.1 (ref 7–25)
CALCIUM SPEC-SCNC: 9.2 MG/DL (ref 8.6–10.5)
CHLORIDE SERPL-SCNC: 104 MMOL/L (ref 98–107)
CHOLEST SERPL-MCNC: 142 MG/DL (ref 0–200)
CO2 SERPL-SCNC: 25.7 MMOL/L (ref 22–29)
CREAT SERPL-MCNC: 0.88 MG/DL (ref 0.76–1.27)
CREAT UR-MCNC: 131.4 MG/DL
DEPRECATED RDW RBC AUTO: 39 FL (ref 37–54)
EGFRCR SERPLBLD CKD-EPI 2021: 106.1 ML/MIN/1.73
EOSINOPHIL # BLD AUTO: 0.06 10*3/MM3 (ref 0–0.4)
EOSINOPHIL NFR BLD AUTO: 1 % (ref 0.3–6.2)
ERYTHROCYTE [DISTWIDTH] IN BLOOD BY AUTOMATED COUNT: 11.8 % (ref 12.3–15.4)
GLOBULIN UR ELPH-MCNC: 2.2 GM/DL
GLUCOSE SERPL-MCNC: 90 MG/DL (ref 65–99)
HCT VFR BLD AUTO: 45.5 % (ref 37.5–51)
HDLC SERPL-MCNC: 37 MG/DL (ref 40–60)
HGB BLD-MCNC: 15.8 G/DL (ref 13–17.7)
IMM GRANULOCYTES # BLD AUTO: 0.03 10*3/MM3 (ref 0–0.05)
IMM GRANULOCYTES NFR BLD AUTO: 0.5 % (ref 0–0.5)
LDLC SERPL CALC-MCNC: 77 MG/DL (ref 0–100)
LDLC/HDLC SERPL: 1.98 {RATIO}
LYMPHOCYTES # BLD AUTO: 2.25 10*3/MM3 (ref 0.7–3.1)
LYMPHOCYTES NFR BLD AUTO: 36.1 % (ref 19.6–45.3)
MCH RBC QN AUTO: 31.7 PG (ref 26.6–33)
MCHC RBC AUTO-ENTMCNC: 34.7 G/DL (ref 31.5–35.7)
MCV RBC AUTO: 91.2 FL (ref 79–97)
MICROALBUMIN/CREAT UR: NORMAL MG/G{CREAT}
MONOCYTES # BLD AUTO: 0.5 10*3/MM3 (ref 0.1–0.9)
MONOCYTES NFR BLD AUTO: 8 % (ref 5–12)
NEUTROPHILS NFR BLD AUTO: 3.33 10*3/MM3 (ref 1.7–7)
NEUTROPHILS NFR BLD AUTO: 53.4 % (ref 42.7–76)
NRBC BLD AUTO-RTO: 0 /100 WBC (ref 0–0.2)
PLATELET # BLD AUTO: 222 10*3/MM3 (ref 140–450)
PMV BLD AUTO: 10.6 FL (ref 6–12)
POTASSIUM SERPL-SCNC: 4.6 MMOL/L (ref 3.5–5.2)
PROT SERPL-MCNC: 6.3 G/DL (ref 6–8.5)
PSA SERPL-MCNC: 0.84 NG/ML (ref 0–4)
RBC # BLD AUTO: 4.99 10*6/MM3 (ref 4.14–5.8)
SODIUM SERPL-SCNC: 137 MMOL/L (ref 136–145)
TRIGL SERPL-MCNC: 159 MG/DL (ref 0–150)
TSH SERPL DL<=0.05 MIU/L-ACNC: 1.01 UIU/ML (ref 0.27–4.2)
VLDLC SERPL-MCNC: 28 MG/DL (ref 5–40)
WBC NRBC COR # BLD AUTO: 6.23 10*3/MM3 (ref 3.4–10.8)

## 2025-02-07 PROCEDURE — G0103 PSA SCREENING: HCPCS

## 2025-02-07 PROCEDURE — 99396 PREV VISIT EST AGE 40-64: CPT | Performed by: INTERNAL MEDICINE

## 2025-02-07 PROCEDURE — 82570 ASSAY OF URINE CREATININE: CPT

## 2025-02-07 PROCEDURE — 80061 LIPID PANEL: CPT

## 2025-02-07 PROCEDURE — 82306 VITAMIN D 25 HYDROXY: CPT

## 2025-02-07 PROCEDURE — 93000 ELECTROCARDIOGRAM COMPLETE: CPT | Performed by: INTERNAL MEDICINE

## 2025-02-07 PROCEDURE — 80050 GENERAL HEALTH PANEL: CPT

## 2025-02-07 PROCEDURE — 82043 UR ALBUMIN QUANTITATIVE: CPT

## 2025-02-07 RX ORDER — LISINOPRIL 20 MG/1
20 TABLET ORAL NIGHTLY
Qty: 90 TABLET | Refills: 3 | Status: SHIPPED | OUTPATIENT
Start: 2025-02-07

## 2025-02-07 NOTE — PROGRESS NOTES
"Chief Complaint   Patient presents with    Annual Exam     fasting       History of Present Illness  49 y.o. male presents for wellness exam. He has rare floaters but vision good.     Review of Systems   Constitutional:  Negative for chills and fever.   Respiratory:  Negative for cough and shortness of breath.    Cardiovascular:  Negative for chest pain and palpitations.   Gastrointestinal:  Negative for abdominal pain, nausea and vomiting.   Skin:  Negative for rash.   Neurological:  Negative for dizziness, light-headedness and headaches.   Psychiatric/Behavioral:  Negative for decreased concentration, dysphoric mood and sleep disturbance. The patient is not nervous/anxious.    All other systems reviewed and are negative.    .    PMSFH:  The following portions of the patient's history were reviewed and updated as appropriate: allergies, current medications, past family history, past medical history, past social history, past surgical history and problem list.      Current Outpatient Medications:     lisinopril (PRINIVIL,ZESTRIL) 20 MG tablet, Take 1 tablet by mouth Every Night., Disp: 90 tablet, Rfl: 3    VITALS:  /88   Pulse 58   Temp 97.7 °F (36.5 °C)   Ht 181 cm (71.26\")   Wt 90.7 kg (200 lb)   BMI 27.69 kg/m²     Physical Exam  Vitals and nursing note reviewed.   Constitutional:       Appearance: Normal appearance. He is well-developed.   HENT:      Head: Normocephalic.      Right Ear: Tympanic membrane and ear canal normal.      Left Ear: Tympanic membrane and ear canal normal.   Eyes:      Extraocular Movements: Extraocular movements intact.      Conjunctiva/sclera: Conjunctivae normal.   Neck:      Vascular: No carotid bruit.   Cardiovascular:      Rate and Rhythm: Regular rhythm. Bradycardia present.      Heart sounds: Normal heart sounds.   Pulmonary:      Effort: Pulmonary effort is normal. No respiratory distress.      Breath sounds: Normal breath sounds.   Abdominal:      General: Bowel " "sounds are normal.      Palpations: Abdomen is soft.      Tenderness: There is no abdominal tenderness.   Genitourinary:     Testes: Normal.      Comments: No prostate nodules appreciated   Musculoskeletal:         General: No swelling.   Skin:     General: Skin is warm and dry.   Neurological:      General: No focal deficit present.      Mental Status: He is alert and oriented to person, place, and time.   Psychiatric:         Mood and Affect: Mood normal.         Behavior: Behavior normal.         LABS:    CMP:  Lab Results   Component Value Date    BUN 8 02/02/2024    CREATININE 1.00 02/02/2024    EGFRIFNONA 99 01/21/2022     02/02/2024    K 4.5 02/02/2024     02/02/2024    CALCIUM 9.5 02/02/2024    ALBUMIN 4.8 02/02/2024    BILITOT 0.6 02/02/2024    ALKPHOS 59 02/02/2024    AST 24 02/02/2024    ALT 23 02/02/2024     CBC:  Lab Results   Component Value Date    WBC 6.08 02/02/2024    RBC 4.97 02/02/2024    HGB 15.7 02/02/2024    HCT 45.5 02/02/2024    MCV 91.5 02/02/2024    MCH 31.6 02/02/2024    MCHC 34.5 02/02/2024    RDW 12.0 (L) 02/02/2024     02/02/2024     LIPID PANEL:  Lab Results   Component Value Date    TRIG 162 (H) 02/02/2024    HDL 37 (L) 02/02/2024    VLDL 28 02/02/2024    LDL 79 02/02/2024    LDLHDL 2.02 02/02/2024     TSH:  Lab Results   Component Value Date    TSH 1.230 02/02/2024     HGBA1C(MOST RECENT):No results found for: \"HGBA1C\"  MICROALBUMIN SPOT URINE:  Lab Results   Component Value Date    MICROALBUR <1.2 02/02/2024       ECG 12 Lead    Date/Time: 2/7/2025 10:03 AM  Performed by: Keenan Jose MD    Authorized by: Keenan Jose MD  Comparison: compared with previous ECG from 1/27/2023  Similar to previous ECG  Rhythm: sinus bradycardia  Rate: bradycardic  QRS axis: normal               ASSESSMENT/PLAN:  Diagnoses and all orders for this visit:    1. Preventative health care (Primary)  Assessment & Plan:  His mood is good overall and will get a skin exam to " follow mild lipoma and skin check with Dr Chapin in next 6 months and follow for eye exam this year. Age-appropriate Counseling:  Discussed preventative medicine issues with patient including regular exercise, healthy diet, stress reduction, adequate sleep and recommended age-appropriate screening studies.  Immunizations reviewed.        Orders:  -     lisinopril (PRINIVIL,ZESTRIL) 20 MG tablet; Take 1 tablet by mouth Every Night.  Dispense: 90 tablet; Refill: 3    2. Hypertriglyceridemia  Assessment & Plan:    Increase fiber and reduce bad carbs and bad fats and check labs     Orders:  -     CBC & Differential; Future  -     Comprehensive Metabolic Panel; Future  -     TSH Rfx On Abnormal To Free T4; Future    3. Essential hypertension  Assessment & Plan:  Hypertension is stable and controlled  Continue current treatment regimen.  Dietary sodium restriction.  Weight loss.  Regular aerobic exercise.  Blood pressure will be reassessed in 1 year.    Orders:  -     Lipid Panel; Future  -     Microalbumin / Creatinine Urine Ratio - Urine, Clean Catch; Future    4. Screening PSA (prostate specific antigen)  -     PSA Screen; Future    5. Vitamin D deficiency  -     Vitamin D,25-Hydroxy; Future    6. Overweight (BMI 25.0-29.9)  Assessment & Plan:  Patient's (Body mass index is 27.69 kg/m².) indicates that they are overweight with health conditions that include hypertension, dyslipidemias, and osteoarthritis . Weight is worsening. BMI is above average; BMI management plan is completed. We discussed portion control and increasing exercise.       Other orders  -     ECG 12 Lead        FOLLOW-UP:  Return in about 1 year (around 2/7/2026) for Annual physical, Labs this visit.      Electronically signed by:    Keenan Jose MD

## 2025-02-07 NOTE — ASSESSMENT & PLAN NOTE
Patient's (Body mass index is 27.69 kg/m².) indicates that they are overweight with health conditions that include hypertension, dyslipidemias, and osteoarthritis . Weight is worsening. BMI is above average; BMI management plan is completed. We discussed portion control and increasing exercise.

## 2025-02-07 NOTE — ASSESSMENT & PLAN NOTE
His mood is good overall and will get a skin exam to follow mild lipoma and skin check with Dr Chapin in next 6 months and follow for eye exam this year. Age-appropriate Counseling:  Discussed preventative medicine issues with patient including regular exercise, healthy diet, stress reduction, adequate sleep and recommended age-appropriate screening studies.  Immunizations reviewed.

## 2025-07-07 DIAGNOSIS — Z00.00 PREVENTATIVE HEALTH CARE: ICD-10-CM

## 2025-07-07 RX ORDER — LISINOPRIL 20 MG/1
20 TABLET ORAL NIGHTLY
Qty: 90 TABLET | Refills: 3 | Status: SHIPPED | OUTPATIENT
Start: 2025-07-07